# Patient Record
Sex: MALE | Race: WHITE | NOT HISPANIC OR LATINO | Employment: UNEMPLOYED | URBAN - METROPOLITAN AREA
[De-identification: names, ages, dates, MRNs, and addresses within clinical notes are randomized per-mention and may not be internally consistent; named-entity substitution may affect disease eponyms.]

---

## 2018-03-24 ENCOUNTER — HOSPITAL ENCOUNTER (INPATIENT)
Facility: HOSPITAL | Age: 23
LOS: 11 days | Discharge: HOME | DRG: 885 | End: 2018-04-04
Attending: PSYCHIATRY & NEUROLOGY | Admitting: PSYCHIATRY & NEUROLOGY
Payer: COMMERCIAL

## 2018-03-24 DIAGNOSIS — R45.851 SUICIDAL IDEATIONS: ICD-10-CM

## 2018-03-24 DIAGNOSIS — F22 PARANOIA (HCC): ICD-10-CM

## 2018-03-24 DIAGNOSIS — F33.3 SEVERE EPISODE OF RECURRENT MAJOR DEPRESSIVE DISORDER, WITH PSYCHOTIC FEATURES (HCC): ICD-10-CM

## 2018-03-24 DIAGNOSIS — F22 PARANOIA (HCC): Primary | ICD-10-CM

## 2018-03-24 DIAGNOSIS — R44.3 HALLUCINATIONS: Primary | ICD-10-CM

## 2018-03-24 LAB
ALBUMIN SERPL BCP-MCNC: 4.3 G/DL (ref 3.5–5)
ALP SERPL-CCNC: 98 U/L (ref 46–116)
ALT SERPL W P-5'-P-CCNC: 26 U/L (ref 12–78)
AMPHETAMINES SERPL QL SCN: NEGATIVE
ANION GAP SERPL CALCULATED.3IONS-SCNC: 12 MMOL/L (ref 4–13)
AST SERPL W P-5'-P-CCNC: 26 U/L (ref 5–45)
BARBITURATES UR QL: NEGATIVE
BASOPHILS # BLD AUTO: 0.03 THOUSANDS/ΜL (ref 0–0.1)
BASOPHILS NFR BLD AUTO: 0 % (ref 0–1)
BENZODIAZ UR QL: NEGATIVE
BILIRUB SERPL-MCNC: 1 MG/DL (ref 0.2–1)
BUN SERPL-MCNC: 11 MG/DL (ref 5–25)
CALCIUM SERPL-MCNC: 8.9 MG/DL (ref 8.3–10.1)
CHLORIDE SERPL-SCNC: 101 MMOL/L (ref 100–108)
CO2 SERPL-SCNC: 25 MMOL/L (ref 21–32)
COCAINE UR QL: NEGATIVE
CREAT SERPL-MCNC: 0.93 MG/DL (ref 0.6–1.3)
EOSINOPHIL # BLD AUTO: 0.04 THOUSAND/ΜL (ref 0–0.61)
EOSINOPHIL NFR BLD AUTO: 1 % (ref 0–6)
ERYTHROCYTE [DISTWIDTH] IN BLOOD BY AUTOMATED COUNT: 13.5 % (ref 11.6–15.1)
ETHANOL EXG-MCNC: NORMAL MG/DL
GFR SERPL CREATININE-BSD FRML MDRD: 117 ML/MIN/1.73SQ M
GLUCOSE SERPL-MCNC: 145 MG/DL (ref 65–140)
HCT VFR BLD AUTO: 46.2 % (ref 36.5–49.3)
HGB BLD-MCNC: 16.3 G/DL (ref 12–17)
LYMPHOCYTES # BLD AUTO: 2.36 THOUSANDS/ΜL (ref 0.6–4.47)
LYMPHOCYTES NFR BLD AUTO: 32 % (ref 14–44)
MCH RBC QN AUTO: 29.3 PG (ref 26.8–34.3)
MCHC RBC AUTO-ENTMCNC: 35.3 G/DL (ref 31.4–37.4)
MCV RBC AUTO: 83 FL (ref 82–98)
METHADONE UR QL: NEGATIVE
MONOCYTES # BLD AUTO: 0.73 THOUSAND/ΜL (ref 0.17–1.22)
MONOCYTES NFR BLD AUTO: 10 % (ref 4–12)
NEUTROPHILS # BLD AUTO: 4.32 THOUSANDS/ΜL (ref 1.85–7.62)
NEUTS SEG NFR BLD AUTO: 57 % (ref 43–75)
OPIATES UR QL SCN: NEGATIVE
PCP UR QL: NEGATIVE
PLATELET # BLD AUTO: 240 THOUSANDS/UL (ref 149–390)
PMV BLD AUTO: 11.5 FL (ref 8.9–12.7)
POTASSIUM SERPL-SCNC: 4.5 MMOL/L (ref 3.5–5.3)
PROT SERPL-MCNC: 8.1 G/DL (ref 6.4–8.2)
RBC # BLD AUTO: 5.56 MILLION/UL (ref 3.88–5.62)
SODIUM SERPL-SCNC: 138 MMOL/L (ref 136–145)
THC UR QL: POSITIVE
WBC # BLD AUTO: 7.48 THOUSAND/UL (ref 4.31–10.16)

## 2018-03-24 PROCEDURE — 99285 EMERGENCY DEPT VISIT HI MDM: CPT

## 2018-03-24 PROCEDURE — 36415 COLL VENOUS BLD VENIPUNCTURE: CPT | Performed by: PHYSICIAN ASSISTANT

## 2018-03-24 PROCEDURE — 85025 COMPLETE CBC W/AUTO DIFF WBC: CPT | Performed by: PHYSICIAN ASSISTANT

## 2018-03-24 PROCEDURE — 82075 ASSAY OF BREATH ETHANOL: CPT | Performed by: PHYSICIAN ASSISTANT

## 2018-03-24 PROCEDURE — 80053 COMPREHEN METABOLIC PANEL: CPT | Performed by: PHYSICIAN ASSISTANT

## 2018-03-24 PROCEDURE — 80307 DRUG TEST PRSMV CHEM ANLYZR: CPT | Performed by: PHYSICIAN ASSISTANT

## 2018-03-24 PROCEDURE — 99252 IP/OBS CONSLTJ NEW/EST SF 35: CPT | Performed by: PHYSICIAN ASSISTANT

## 2018-03-24 RX ORDER — NICOTINE 21 MG/24HR
1 PATCH, TRANSDERMAL 24 HOURS TRANSDERMAL DAILY
Status: CANCELLED | OUTPATIENT
Start: 2018-03-25

## 2018-03-24 RX ORDER — IBUPROFEN 400 MG/1
400 TABLET ORAL EVERY 8 HOURS PRN
Status: CANCELLED | OUTPATIENT
Start: 2018-03-24

## 2018-03-24 RX ORDER — IBUPROFEN 600 MG/1
600 TABLET ORAL EVERY 8 HOURS PRN
Status: DISCONTINUED | OUTPATIENT
Start: 2018-03-24 | End: 2018-04-04 | Stop reason: HOSPADM

## 2018-03-24 RX ORDER — IBUPROFEN 600 MG/1
600 TABLET ORAL EVERY 8 HOURS PRN
Status: CANCELLED | OUTPATIENT
Start: 2018-03-24

## 2018-03-24 RX ORDER — IBUPROFEN 400 MG/1
400 TABLET ORAL EVERY 8 HOURS PRN
Status: DISCONTINUED | OUTPATIENT
Start: 2018-03-24 | End: 2018-04-04 | Stop reason: HOSPADM

## 2018-03-24 RX ORDER — HALOPERIDOL 5 MG/ML
5 INJECTION INTRAMUSCULAR EVERY 6 HOURS PRN
Status: CANCELLED | OUTPATIENT
Start: 2018-03-24

## 2018-03-24 RX ORDER — HYDROXYZINE HYDROCHLORIDE 25 MG/1
25 TABLET, FILM COATED ORAL EVERY 6 HOURS PRN
Status: CANCELLED | OUTPATIENT
Start: 2018-03-24

## 2018-03-24 RX ORDER — HYDROXYZINE HYDROCHLORIDE 25 MG/1
25 TABLET, FILM COATED ORAL EVERY 6 HOURS PRN
Status: DISCONTINUED | OUTPATIENT
Start: 2018-03-24 | End: 2018-04-04 | Stop reason: HOSPADM

## 2018-03-24 RX ORDER — IBUPROFEN 800 MG/1
800 TABLET ORAL EVERY 8 HOURS PRN
Status: DISCONTINUED | OUTPATIENT
Start: 2018-03-24 | End: 2018-04-04 | Stop reason: HOSPADM

## 2018-03-24 RX ORDER — LORAZEPAM 2 MG/ML
2 INJECTION INTRAMUSCULAR EVERY 4 HOURS PRN
Status: CANCELLED | OUTPATIENT
Start: 2018-03-24

## 2018-03-24 RX ORDER — HALOPERIDOL 5 MG/ML
5 INJECTION INTRAMUSCULAR EVERY 6 HOURS PRN
Status: DISCONTINUED | OUTPATIENT
Start: 2018-03-24 | End: 2018-04-04 | Stop reason: HOSPADM

## 2018-03-24 RX ORDER — ACETAMINOPHEN 325 MG/1
650 TABLET ORAL EVERY 6 HOURS PRN
Status: DISCONTINUED | OUTPATIENT
Start: 2018-03-24 | End: 2018-04-04 | Stop reason: HOSPADM

## 2018-03-24 RX ORDER — LORAZEPAM 2 MG/ML
2 INJECTION INTRAMUSCULAR EVERY 4 HOURS PRN
Status: DISCONTINUED | OUTPATIENT
Start: 2018-03-24 | End: 2018-04-04 | Stop reason: HOSPADM

## 2018-03-24 RX ORDER — HALOPERIDOL 5 MG
5 TABLET ORAL EVERY 6 HOURS PRN
Status: CANCELLED | OUTPATIENT
Start: 2018-03-24

## 2018-03-24 RX ORDER — MAGNESIUM HYDROXIDE/ALUMINUM HYDROXICE/SIMETHICONE 120; 1200; 1200 MG/30ML; MG/30ML; MG/30ML
20 SUSPENSION ORAL EVERY 4 HOURS PRN
Status: DISCONTINUED | OUTPATIENT
Start: 2018-03-24 | End: 2018-04-04 | Stop reason: HOSPADM

## 2018-03-24 RX ORDER — IBUPROFEN 400 MG/1
800 TABLET ORAL EVERY 8 HOURS PRN
Status: CANCELLED | OUTPATIENT
Start: 2018-03-24

## 2018-03-24 RX ORDER — HALOPERIDOL 5 MG
5 TABLET ORAL EVERY 6 HOURS PRN
Status: DISCONTINUED | OUTPATIENT
Start: 2018-03-24 | End: 2018-04-04 | Stop reason: HOSPADM

## 2018-03-24 RX ORDER — BENZTROPINE MESYLATE 1 MG/1
1 TABLET ORAL EVERY 6 HOURS PRN
Status: DISCONTINUED | OUTPATIENT
Start: 2018-03-24 | End: 2018-04-04 | Stop reason: HOSPADM

## 2018-03-24 RX ORDER — NICOTINE 21 MG/24HR
1 PATCH, TRANSDERMAL 24 HOURS TRANSDERMAL DAILY
Status: DISCONTINUED | OUTPATIENT
Start: 2018-03-25 | End: 2018-04-04 | Stop reason: HOSPADM

## 2018-03-24 RX ORDER — BENZTROPINE MESYLATE 1 MG/1
1 TABLET ORAL EVERY 6 HOURS PRN
Status: CANCELLED | OUTPATIENT
Start: 2018-03-24

## 2018-03-24 RX ORDER — ACETAMINOPHEN 325 MG/1
650 TABLET ORAL EVERY 6 HOURS PRN
Status: CANCELLED | OUTPATIENT
Start: 2018-03-24

## 2018-03-24 RX ORDER — MAGNESIUM HYDROXIDE/ALUMINUM HYDROXICE/SIMETHICONE 120; 1200; 1200 MG/30ML; MG/30ML; MG/30ML
20 SUSPENSION ORAL EVERY 4 HOURS PRN
Status: CANCELLED | OUTPATIENT
Start: 2018-03-24

## 2018-03-24 NOTE — ED NOTES
Crisis called to speak with pt and pt hung up on him  Pt seems irritated and is asking to leave        Baudilio Mccall RN  03/24/18 3716

## 2018-03-24 NOTE — ED NOTES
Patient unable to urinate at this time, patient given fluids, tolerating well       Teresa Nova RN  03/24/18 3438

## 2018-03-24 NOTE — ED NOTES
CW did intake and SA over phone with Pt's permission  Pt's mother brought Pt to ED stating that Pt is not sleeping for days, exhibiting bizarre behavior, talking to self, as well as paranoid thoughts  She also stated that Pt has quit his job and is walking the house at night and entering her room  Pt's mother also stated to ED staff that Pt made a noose from a belt last week  Pt admits to making belt noose last week as well as feeling suicidal last week by denies since then  Pt admits to not sleeping for days on end and only a few hours a day most days past few weeks  Pt also admits to some of the behaviors that his mother describes and stated that he is "working through that "  Pt denies HI and psychosis  Pt was offered to sign himself in or his mother was going to petition for involuntary commitment  CW explained Pt his rights and Pt decided to sign 201  CW faxed 201 to [facility] ED   CW called [facility] as bed is available  CW directed ED at [facility] to fax signed [phone] to 2W for review
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
## 2018-03-24 NOTE — ED NOTES
Patient states, "im not doing the telephone or the video chat  I want to go home " Patient observed be to agitated pacing in room  Patient redirected by RN to speak to crisis regarding crisis evaluation  Patient agreed        Ramiro Young RN  03/24/18 6420

## 2018-03-24 NOTE — ED NOTES
CW called Pt from [facility] and offered to do intake and SA either over phone or through telepsych and Pt first stated over the phone  Pt then hung up on [address] called back ED at [facility] and was told Pt does not want to speak to CW and is paranoid and wants to leave  Ed Rn and PA at [facility] will have further conversation with Pt regarding cooperation with crisis

## 2018-03-25 PROBLEM — F29 PSYCHOSIS (HCC): Status: ACTIVE | Noted: 2018-03-25

## 2018-03-25 PROBLEM — F32.3 MAJOR DEPRESSIVE DISORDER WITH PSYCHOTIC FEATURES (HCC): Status: ACTIVE | Noted: 2018-03-25

## 2018-03-25 PROCEDURE — 99223 1ST HOSP IP/OBS HIGH 75: CPT | Performed by: PSYCHIATRY & NEUROLOGY

## 2018-03-25 RX ORDER — RISPERIDONE 1 MG/1
1 TABLET, FILM COATED ORAL 2 TIMES DAILY
Status: DISCONTINUED | OUTPATIENT
Start: 2018-03-25 | End: 2018-03-25
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
## 2018-03-25 NOTE — H&P
Psychiatric Evaluation - [address] 26 y o  male MRN: [id number]  Unit/Bed#: [facility] 263-01 Encounter: [id number]    Assessment/Plan   Principal Problem:    Major depressive disorder with psychotic features ([address] )    Plan:   1  Discussed risperdal and ssri, he is refusing all medications currently  2  Collateral from family  3  Check lipid panel, tsh  Risks, benefits and possible side effects of Medications:   Patient does not verbalize understanding at this time and will require further explanation  unwilling to stay long inpatient, "I don't need medications"    Chief Complaint: "I have not been myself lately"    History of Present Illness     Patient is a 26 y o  male presents with Signs of acute psychosis  Patient was admitted to psychiatric unit on a voluntarily 201 commitment basis  He was brought to ED by family for evaluation of bizarre behavior, paranoia and attempt to hang self last week with belt  Mother wanted to 36 but patient signed in  Primary complaints include: poor sleep, depression worse and feeling suicidal   Onset of symptoms was gradual starting a few weeks ago with gradually worsening course since that time   Psychosocial Stressors: family, financial, drug and alcohol and social   "I want to get my life back, I'm pretty sound in my mind"    Psychiatric Review Of Systems:  sleep: yes  appetite changes: yes  weight changes: yes "I'm losing weight at a steady pace"  energy/anergy: yes  interest/pleasure/anhedonia: yes  somatic symptoms: no  anxiety/panic: yes  ksenia: poor sleep  guilty/hopeless: yes, feels   self injurious behavior/risky behavior: yes    Historical Information     Past Psychiatric History:   Therapy, Out Patient and evaluated at [facility] for evaluation  Prior diagnosis of ADHD as child  Currently in treatment with therapist  Past Suicide attempts: none prior  Past Violent behavior: if needed to protect  Past Psychiatric medication trial: Kevyn Viveros ("I get frizzy"    Substance Abuse History:  Social History     Tobacco History     Smoking Status  Current Every Day Smoker Smoking Frequency  0 25 packs/day    Smokeless Tobacco Use  Never Used          Alcohol History     Alcohol Use Status  Yes Comment  occasionally          Drug Use     Drug Use Status  Yes Types  Marijuana, Prescription Comment  hx of           Sexual Activity     Sexually Active  Not Asked          Activities of Daily Living    Not Asked               Additional Substance Use Detail     Questions Responses    Substance Use Assessment Substance use within the past 12 months    Alcohol Use Frequency Experimented    Cannabis frequency 3 or more times/week    Comment: 3 or more times/week on 3/24/2018     Heroin Frequency Denies use in past 12 months    Last Use of Alcohol & Amount socially    Crack Cocaine Frequency Denies use in past 12 months    Methamphetamine Frequency Denies use in past 12 months    Narcotic Frequency Denies use in past 12 months    Benzodiazepine Frequency Denies use in past 12 months    Amphetamine frequency Denies use in past 12 months    Barbituate Frequency Denies use use in past 12 months    Inhalant frequency Never used    Comment: Never used on 3/24/2018     Hallucinogen frequency Never used    Comment: Never used on 3/24/2018     Ecstasy frequency Never used    Comment: Never used on 3/24/2018     Other drug frequency Never used    Comment: Never used on 3/24/2018     Opiate frequency Denies use in past 12 months    Last reviewed by Patti Anthony RN on 3/24/2018        I have assessed this patient for substance use within the past 12 months    Family Psychiatric History:   Psychiatric Illness grandfather Illness: bipolar and Hospitalization: inpatient  No substance    Social History:  Education: some college  Marital history: single  Living arrangement, social support: The patient lives in home with mother and brother    Occupational History: worked at [employer]  Functioning Relationships: alone & isolated  Other Pertinent History: None      Traumatic History:   Abuse: "nothing really that serious"  Other Traumatic Events: father passed away dennise patient was very young    Past Medical History:   Diagnosis Date    ADHD     Oppositional defiant disorder     Psychiatric illness     Psychosis        Medical Review Of Systems:  Pertinent items are noted in HPI      Meds/Allergies   current meds:   Current Facility-Administered Medications   Medication Dose Route Frequency    acetaminophen (TYLENOL) tablet 650 mg  650 mg Oral Q6H PRN    aluminum-magnesium hydroxide-simethicone (MYLANTA) 200-200-20 mg/5 mL oral suspension 20 mL  20 mL Oral Q4H PRN    benztropine (COGENTIN) tablet 1 mg  1 mg Oral Q6H PRN    haloperidol (HALDOL) tablet 5 mg  5 mg Oral Q6H PRN    haloperidol lactate (HALDOL) injection 5 mg  5 mg Intramuscular Q6H PRN    hydrOXYzine HCL (ATARAX) tablet 25 mg  25 mg Oral Q6H PRN    ibuprofen (MOTRIN) tablet 400 mg  400 mg Oral Q8H PRN    ibuprofen (MOTRIN) tablet 600 mg  600 mg Oral Q8H PRN    ibuprofen (MOTRIN) tablet 800 mg  800 mg Oral Q8H PRN    influenza inactivated quadrivalent vaccine (FLULAVAL) IM injection 0 5 mL  0 5 mL Intramuscular Prior to discharge    LORazepam (ATIVAN) 2 mg/mL injection 2 mg  2 mg Intramuscular Q4H PRN    magnesium hydroxide (MILK OF MAGNESIA) 400 mg/5 mL oral suspension 30 mL  30 mL Oral Daily PRN    nicotine (NICODERM CQ) 21 mg/24 hr TD 24 hr patch 1 patch  1 patch Transdermal Daily     Allergies   Allergen Reactions    Adderall [Amphetamine-Dextroamphetamine]      "temper tantrums and behavior when he was in 3rd grade" - mother    Amoxicillin Hives       Objective   Vital signs in last 24 hours:  Temp:  [96 °F (35 6 °C)-98 5 °F (36 9 °C)] 98 5 °F (36 9 °C)  HR:  [] 88  Resp:  [17-18] 17  BP: (119-131)/(58-76) 119/58    No intake or output data in the 24 hours ending 03/25/18 1128    Mental Status Evaluation:  Appearance:  casually dressed and disheveled   Behavior:  evasive and guarded   Speech:  soft   Mood:  depressed   Affect:  labile   Language: naming objects   Thought Process:  sparse   Thought Content:  delusions  paranoid   Perceptual Disturbances: appears preoccupied but denies AVH "I'm not psychotic"   Risk Potential: Suicidal Ideations without plan "I feel that I don't belong"   Sensorium:  person, place and situation   Cognition:  grossly intact   Consciousness:  alert and awake    Attention: attention span appeared shorter than expected for age   Intellect: not examined   Fund of Knowledge: awareness of current events: intact   Insight:  limited   Judgment: limited   Muscle Strength and Tone: moves all extremities equally   Gait/Station: normal gait/station   Motor Activity: no abnormal movements     Lab Results:   CBC:   Lab Results   Component Value Date    WBC 7 48 03/24/2018    HGB 16 3 03/24/2018    HCT 46 2 03/24/2018    MCV 83 03/24/2018     03/24/2018    MCH 29 3 03/24/2018    MCHC 35 3 03/24/2018    RDW 13 5 03/24/2018    MPV 11 5 03/24/2018       Imaging Studies: none ordered  EKG, Pathology, and Other Studies: none ordered  Code Status: Level 1 - Full Code  Advance Directive and Living Will:      Power of :    POLST:        Patient Strengths/Assets: average or above intelligence, work skills    Patient Barriers/Limitations: low self esteem, patient is unwilling to work on problems, poor interpersonal skills

## 2018-03-25 NOTE — ED NOTES
PC placed to [phone] where Dannial Saint directed [address] to [phone]   PC placed to that number Madisyn Tripp stated she placed client in the [address] and am awaiting a call back from a care manager

## 2018-03-25 NOTE — ED NOTES
COB completed with [facility], who requested they be called when pt is DC from the unit

## 2018-03-25 NOTE — PROGRESS NOTES
Pt has been sleeping all of night shift  Pt was offered quiet room, but decided to sleep in his room  Will continue to monitor

## 2018-03-25 NOTE — PROGRESS NOTES
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

## 2018-03-25 NOTE — ED NOTES
PC placed to [phone] where Charley Srinivasan approved 7 dayd from 3/24-3/30/2018 with review on the 30th   Reference number of [id number]

## 2018-03-25 NOTE — CONSULTS
Consult Note  Assessment:  Paranoia  ADHD  Substance abuse-UDS is positive for THC on admission      Plan:  Admitted in the hospital for psych evaluation  Continue all medications  ______________________________________________________________________    Consulting Service: Psychiatry    Chief Complaint:  Behavioral changes, suicide ideation    HPI: Sivakumar Yu, a 26 y o  male is admitted in the hospital for worsening depression, behavioral changes, paranoia and suicide ideation/attempt  Patient has been talking to himself and states that his talking to a female, feels that he is being watched and followed by 80 mm, now SI and CA are watching him  History of sleep due to disturbances as well, sleeping for days as well  Patient denies any homicide ideation, any auditory or visual hallucinations  Please refer to psych evaluation for more information  Patient at present denies any medical issues including chest pain, difficulty breathing, fever, chills, nausea, vomiting, or any abdominal issues    Review of Systems:  General: negative  Cardiovascular: no chest pain or dyspnea on exertion  Respiratory: no cough, shortness of breath, or wheezing  Gastrointestinal: no abdominal pain, change in bowel habits, or black or bloody stools  Genitourinary ROS: no dysuria, trouble voiding, or hematuria  Musculoskeletal ROS: negative  Neurological ROS: no TIA or stroke symptoms  Hematological and Lymphatic ROS: negative  Dermatological ROS: negative  Psychological ROS: positive for - depression, sleep disturbances, suicidal ideation and suicide attempt, paranoia  Ophthalmic ROS: negative  ENT ROS: negative    Past Medical History:  Past Medical History:   Diagnosis Date    ADHD     Oppositional defiant disorder     Psychiatric illness     Psychosis        Past Surgical History:  Past Surgical History:   Procedure Laterality Date    TONSILECTOMY AND ADNOIDECTOMY      WISDOM TOOTH EXTRACTION         Social History:  History   Alcohol Use    Yes     Comment: occasionally     History   Drug Use    Types: Prescription     Comment: hx of      History   Smoking Status    Current Every Day Smoker   Smokeless Tobacco    Never Used       Family History:  History reviewed  No pertinent family history  Allergies: Allergies   Allergen Reactions    Adderall [Amphetamine-Dextroamphetamine]      "temper tantrums and behavior when he was in 3rd grade" - mother    Amoxicillin Hives       Medications:  Current Facility-Administered Medications   Medication Dose Route Frequency    acetaminophen (TYLENOL) tablet 650 mg  650 mg Oral Q6H PRN    aluminum-magnesium hydroxide-simethicone (MYLANTA) 200-200-20 mg/5 mL oral suspension 20 mL  20 mL Oral Q4H PRN    benztropine (COGENTIN) tablet 1 mg  1 mg Oral Q6H PRN    haloperidol (HALDOL) tablet 5 mg  5 mg Oral Q6H PRN    haloperidol lactate (HALDOL) injection 5 mg  5 mg Intramuscular Q6H PRN    hydrOXYzine HCL (ATARAX) tablet 25 mg  25 mg Oral Q6H PRN    ibuprofen (MOTRIN) tablet 400 mg  400 mg Oral Q8H PRN    ibuprofen (MOTRIN) tablet 600 mg  600 mg Oral Q8H PRN    ibuprofen (MOTRIN) tablet 800 mg  800 mg Oral Q8H PRN    LORazepam (ATIVAN) 2 mg/mL injection 2 mg  2 mg Intramuscular Q4H PRN    magnesium hydroxide (MILK OF MAGNESIA) 400 mg/5 mL oral suspension 30 mL  30 mL Oral Daily PRN    [START ON 3/25/2018] nicotine (NICODERM CQ) 21 mg/24 hr TD 24 hr patch 1 patch  1 patch Transdermal Daily       Vitals:    03/24/18 2034   BP: 131/75   Pulse: 89   Resp: 18   Temp: (!) 96 °F (35 6 °C)   SpO2:          I/Os:  No intake/output data recorded  No intake/output data recorded      Lab Results and Cultures:   CBC with diff:   Lab Results   Component Value Date    WBC 7 48 03/24/2018    HGB 16 3 03/24/2018    HCT 46 2 03/24/2018    MCV 83 03/24/2018     03/24/2018    MCH 29 3 03/24/2018    MCHC 35 3 03/24/2018    RDW 13 5 03/24/2018    MPV 11 5 03/24/2018   , BMP/CMP:  Lab Results   Component Value Date     03/24/2018    K 4 5 03/24/2018     03/24/2018    CO2 25 03/24/2018    ANIONGAP 12 03/24/2018    BUN 11 03/24/2018    CREATININE 0 93 03/24/2018    GLUCOSE 145 (H) 03/24/2018    CALCIUM 8 9 03/24/2018    AST 26 03/24/2018    ALT 26 03/24/2018    ALKPHOS 98 03/24/2018    PROT 8 1 03/24/2018    BILITOT 1 00 03/24/2018    EGFR 117 03/24/2018       Physical Exam:    General appearance: alert and oriented, in no acute distress  Head: Normocephalic, without obvious abnormality, atraumatic  Eyes: conjunctivae/corneas clear  PERRL, EOM's intact  Fundi benign  Throat: lips, mucosa, and tongue normal; teeth and gums normal  Neck: no adenopathy, no carotid bruit, no JVD, supple, symmetrical, trachea midline and thyroid not enlarged, symmetric, no tenderness/mass/nodules  Back: symmetric, no curvature  ROM normal  No CVA tenderness  Lungs: clear to auscultation bilaterally  Chest wall: no tenderness  Heart: regular rate and rhythm, S1, S2 normal, no murmur, click, rub or gallop  Abdomen: soft, non-tender; bowel sounds normal; no masses,  no organomegaly  Genitalia: deferred  Rectal: deferred  Extremities: extremities normal, warm and well-perfused; no cyanosis, clubbing, or edema  Skin: Skin color, texture, turgor normal  No rashes or lesions  Neurologic: Alert and oriented X 3, normal strength and tone  Normal symmetric reflexes   Normal coordination and gait      Julisa Gusman PA-C  3/24/2018

## 2018-03-26 LAB
CHOLEST SERPL-MCNC: 114 MG/DL (ref 50–200)
HDLC SERPL-MCNC: 58 MG/DL (ref 40–60)
LDLC SERPL CALC-MCNC: 43 MG/DL (ref 0–100)
TRIGL SERPL-MCNC: 67 MG/DL
TSH SERPL DL<=0.05 MIU/L-ACNC: 1.95 UIU/ML (ref 0.36–3.74)

## 2018-03-26 PROCEDURE — 99232 SBSQ HOSP IP/OBS MODERATE 35: CPT | Performed by: PSYCHIATRY & NEUROLOGY

## 2018-03-26 PROCEDURE — 84443 ASSAY THYROID STIM HORMONE: CPT | Performed by: PSYCHIATRY & NEUROLOGY

## 2018-03-26 PROCEDURE — 80061 LIPID PANEL: CPT | Performed by: PSYCHIATRY & NEUROLOGY

## 2018-03-26 RX ORDER — OLANZAPINE 5 MG/1
5 TABLET, ORALLY DISINTEGRATING ORAL
Status: DISCONTINUED | OUTPATIENT
Start: 2018-03-26 | End: 2018-03-28

## 2018-03-26 RX ADMIN — HALOPERIDOL 5 MG: 5 TABLET ORAL at 18:59

## 2018-03-26 RX ADMIN — NICOTINE POLACRILEX 4 MG: 4 GUM, CHEWING ORAL at 20:49

## 2018-03-26 RX ADMIN — OLANZAPINE 5 MG: 5 TABLET, ORALLY DISINTEGRATING ORAL at 21:21

## 2018-03-26 RX ADMIN — HYDROXYZINE HYDROCHLORIDE 25 MG: 25 TABLET, FILM COATED ORAL at 18:59

## 2018-03-26 NOTE — PLAN OF CARE
Problem: Ineffective Coping  Goal: Participates in unit activities  Interventions:  - Provide therapeutic environment   - Provide required programming   - Redirect inappropriate behaviors    Outcome: Progressing  Pt attended groups with prompting  He presented as restless often standing to pace room  Pt with difficulty focusing on presented tasks and appeared distracted throughout group  He was observed shaking his head, smiling, and whispering to himself at times    Pt declined to complete task and proceeded to throw project in trash can stating"I don't like art and crafts "

## 2018-03-26 NOTE — PROGRESS NOTES
Pt has been visible on the unit and has attended groups  He denies S/H/I or AVH, and states he just wants to get back to his life of being a  and pursue being EMT  Will continue to monitor, provide support and encouragement
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
## 2018-03-26 NOTE — TREATMENT PLAN
TREATMENT PLAN REVIEW - [address] [facility] [address] 23 y o  1995 male MRN: [id number]    [address] [place] [address] [facility] / [facility] 424-26 Encounter: [id number]          Admit Date/Time:  3/24/2018  2:01 PM    Treatment Team: Attending Provider: Caleb Cobian; Registered Nurse: Wendy Tinoco, RN; Consulting Physician: Joelle Jain PA-C; Patient Care Assistant: Alessio Wyman;  Patient Care Technician: Pablo Felix; Registered Nurse: Luisito Hatch RN; Occupational Therapy Assistant: CECILLE Pemberton; : Carol Cool    Diagnosis: Principal Problem:    Major depressive disorder with psychotic features [facility]    Patient Strengths/Assets: cooperative, good past treatment response, motivation for treatment/growth, patient is on a voluntary commitment    Patient Barriers/Limitations: low self esteem, substance abuse    Short Term Goals: decrease in depressive symptoms, decrease in anxiety symptoms, decrease in paranoid thoughts, decrease in psychotic symptoms, decrease in suicidal thoughts    Long Term Goals: improvement in depression, improvement in anxiety, stabilization of mood, free of suicidal thoughts, resolution of psychotic symptoms, acceptance of need for psychiatric medications, acceptance of need for psychiatric treatment, acceptance of need for psychiatric follow up after discharge    Progress Towards Goals: starting psychiatric medications as prescribed    Recommended Treatment: medication management, patient medication education, group therapy, milieu therapy, continued Behavioral Health psychiatric evaluation/assessment process    Treatment Frequency: daily medication monitoring, group and milieu therapy daily, monitoring through interdisciplinary rounds, monitoring through weekly patient care conferences    Expected Discharge Date: 5-7 days    Discharge Plan: referral for outpatient medication management with a psychiatrist, referral for outpatient psychotherapy    Treatment Plan Created/Updated By: Brook Shepherd

## 2018-03-26 NOTE — PROGRESS NOTES
Progress Note - Behavioral Health   Giancarlo [place] 26 y o  male MRN: [id number]  Unit/Bed#: U 263-01 Encounter: [id number]    Assessment/Plan   Principal Problem:    Major depressive disorder with psychotic features ([facility] [address] )      Subjective:  Patient remained guarded and paranoid during entire evaluation  Patient was initially not cooperative with evaluation and I had to read emergency room doctor's note and mother's concerns for this admission  Patient did agreed that he was paranoid for longtime but after using Vyvanse 2 months ago his paranoia got worse followed by decline in sleep, increase anxiety and depression  Patient initially was not agreeable with medication  He was allowed to express his feeling and then he verbalized that he will trust me  Agreed with plan of taking Zyprexa at at [facility] now  Current Medications:    Current Facility-Administered Medications:  acetaminophen 650 mg Oral Q6H PRN Governor Alexandra MD   aluminum-magnesium hydroxide-simethicone 20 mL Oral Q4H PRN Governor Alexandra MD   benztropine 1 mg Oral Q6H PRN Governor Alexandra MD   haloperidol 5 mg Oral Q6H PRN Governor Alexandra MD   haloperidol lactate 5 mg Intramuscular Q6H PRN Governor Alexandra MD   hydrOXYzine HCL 25 mg Oral Q6H PRN Governor Alexandra MD   ibuprofen 400 mg Oral Q8H PRN Governor Alexandra MD   ibuprofen 600 mg Oral Q8H PRN Governor Alexandra MD   ibuprofen 800 mg Oral Q8H PRN Governor Alexandra MD   influenza vaccine 0 5 mL Intramuscular Prior to discharge Luis Grider   LORazepam 2 mg Intramuscular Q4H PRN Governor Alexandra MD   magnesium hydroxide 30 mL Oral Daily PRN Governor Alexandra MD   nicotine 1 patch Transdermal Daily Governor Alexandra MD   OLANZapine 5 mg Oral HS [address] Medications: all current active meds have been reviewed      Vital signs in last 24 hours:  Temp:  [95 °F (35 °C)-97 3 °F (36 3 °C)] 97 3 °F (36 3 °C)  HR: [] 120  Resp:  [18] 18  BP: (116-131)/(71-73) 116/73    Laboratory results:    I have personally reviewed all pertinent laboratory/tests results    Labs in last 72 hours:   Recent Labs      03/24/18   1504  03/24/18   1505  03/26/18   0614   WBC   --   7 48   --    RBC   --   5 56   --    HGB   --   16 3   --    HCT   --   46 2   --    PLT   --   240   --    RDW   --   13 5   --    NEUTROABS   --   4 32   --    NA  138   --    --    K  4 5   --    --    CL  101   --    --    CO2  25   --    --    BUN  11   --    --    CREATININE  0 93   --    --    GLUCOSE  145*   --    --    CALCIUM  8 9   --    --    AST  26   --    --    ALT  26   --    --    ALKPHOS  98   --    --    PROT  8 1   --    --    BILITOT  1 00   --    --    CHOL   --    --   114   HDL   --    --   58   TRIG   --    --   67   LDLCALC   --    --   43   [id number]   --    --   1 949     Admission Labs:   Admission on 03/24/2018   Component Date Value    EXTBreath Alcohol 03/24/2018 neg     Amph/Meth UR 03/24/2018 Negative     Barbiturate Ur 03/24/2018 Negative     Benzodiazepine Urine 03/24/2018 Negative     Cocaine Urine 03/24/2018 Negative     Methadone Urine 03/24/2018 Negative     Opiate Urine 03/24/2018 Negative     PCP Ur 03/24/2018 Negative     THC Urine 03/24/2018 Positive*    WBC 03/24/2018 7 48     RBC 03/24/2018 5 56     Hemoglobin 03/24/2018 16 3     Hematocrit 03/24/2018 46 2     MCV 03/24/2018 83     MCH 03/24/2018 29 3     MCHC 03/24/2018 35 3     RDW 03/24/2018 13 5     MPV 03/24/2018 11 5     Platelets 79/77/5351 240     Neutrophils Relative 03/24/2018 57     Lymphocytes Relative 03/24/2018 32     Monocytes Relative 03/24/2018 10     Eosinophils Relative 03/24/2018 1     Basophils Relative 03/24/2018 0     Neutrophils Absolute 03/24/2018 4 32     Lymphocytes Absolute 03/24/2018 2 36     Monocytes Absolute 03/24/2018 0 73     Eosinophils Absolute 03/24/2018 0 04     Basophils Absolute 03/24/2018 0 03  Sodium 03/24/2018 138     Potassium 03/24/2018 4 5     Chloride 03/24/2018 101     CO2 03/24/2018 25     Anion Gap 03/24/2018 12     BUN 03/24/2018 11     Creatinine 03/24/2018 0 93     Glucose 03/24/2018 145*    Calcium 03/24/2018 8 9     AST 03/24/2018 26     ALT 03/24/2018 26     Alkaline Phosphatase 03/24/2018 98     Total Protein 03/24/2018 8 1     Albumin 03/24/2018 4 3     Total Bilirubin 03/24/2018 1 00     eGFR 03/24/2018 117     Cholesterol 03/26/2018 114     Triglycerides 03/26/2018 67     HDL, Direct 03/26/2018 58     LDL Calculated 03/26/2018 43     TSH 3RD GENERATON 03/26/2018 1 949        Psychiatric Review of Systems:  Behavior over the last 24 hours:  unchanged  Sleep: insomnia  Appetite: normal  Medication side effects: No  ROS: no complaints    Mental Status Evaluation:  Appearance:  casually dressed   Behavior:  guarded   Speech:  soft   Mood:  angry, anxious and depressed   Affect:  blunted   Language sparse   Thought Process:  circumstantial and disorganized   Thought Content:  delusions  persecutory   Perceptual Disturbances: preoccupied   Risk Potential: Suicidal Ideations without plan, Homicidal Ideations none and Potential for Aggression No   Sensorium:  person and place   Cognition:  grossly intact   Consciousness:  awake    Attention: attention span appeared shorter than expected for age   Insight:  limited   Judgment: limited   Intellect fair   Gait/Station: normal gait/station   Motor Activity: no abnormal movements     Progress Toward Goals: slow progress    Recommended Treatment:   Add Olanzapine 5 mg HS for psychosis and mood stabilization  Continue with group therapy, milieu therapy and occupational therapy      Continue following current medications:   Current Facility-Administered Medications:  acetaminophen 650 mg Oral Q6H PRN Leo Marquez MD   aluminum-magnesium hydroxide-simethicone 20 mL Oral Q4H PRN Leo Marquez MD   benztropine 1 mg Oral Q6H PRN Sabrina Mishra MD   haloperidol 5 mg Oral Q6H PRN Sabrina Mishra MD   haloperidol lactate 5 mg Intramuscular Q6H PRN Sabrina Mishra MD   hydrOXYzine HCL 25 mg Oral Q6H PRN Sabrina Mishra MD   ibuprofen 400 mg Oral Q8H PRN Sabrina Mishra MD   ibuprofen 600 mg Oral Q8H PRN Sabrina Mishra MD   ibuprofen 800 mg Oral Q8H PRN Sabrina Mishra MD   influenza vaccine 0 5 mL Intramuscular Prior to discharge Luis Grider   LORazepam 2 mg Intramuscular Q4H PRN Sabrina Mishra MD   magnesium hydroxide 30 mL Oral Daily PRN Sabrina Mishra MD   nicotine 1 patch Transdermal Daily Sabrina Mishra MD   OLANZapine 5 mg Oral HS Luis Grider       Risks, benefits and possible side effects of Medications:   Risks, benefits, and possible side effects of medications explained to patient and patient verbalizes understanding  This note has been constructed using a voice recognition system  There may be translation, syntax,  or grammatical errors  If you have any questions, please contact the dictating provider

## 2018-03-26 NOTE — PROGRESS NOTES
Pt was observed at the exit door, looking out window, smiling inappropriately  Pt was reminded that alarm will sound if door is opened  Pt was encouraged to move away from door  Will continue to monitor

## 2018-03-26 NOTE — PROGRESS NOTES
Denies any mental issues  Said that he doesn't need medication and/ or treatment  Gently urged him to think about why Mom brought him here and that medications can be helpful to think more clearly  Shows history of poor coping  We discussed this too  Pt acknowledged but wouldn't look at writer and laughed inappropriately  Seclusive to room and self  Denies SI and HI  Denies A/ V Hallucinations

## 2018-03-26 NOTE — PLAN OF CARE
Problem: Alteration in Thoughts and Perception  Goal: Treatment Goal: Gain control of psychotic behaviors/thinking, reduce/eliminate presenting symptoms and demonstrate improved reality functioning upon discharge  Outcome: Not Progressing    Goal: Verbalize thoughts and feelings  Interventions:  - Promote a nonjudgmental and trusting relationship with the patient through active listening and therapeutic communication  - Assess patient's level of functioning, behavior and potential for risk  - Engage patient in 1 on 1 interactions for a minimum of 15 minutes each session  - Encourage patient to express fears, feelings, frustrations, and discuss symptoms    - [place] patient to reality, help patient recognize reality-based thinking   - Administer medications as ordered and assess for potential side effects  - Provide the patient education related to the signs and symptoms of the illness and desired effects of prescribed medications   Outcome: Progressing    Goal: Refrain from acting on delusional thinking/internal stimuli  Interventions:  - Monitor patient closely, per order   - Utilize least restrictive measures   - Set reasonable limits, give positive feedback for acceptable   - Administer medications as ordered and monitor of potential side effects   Outcome: Not Progressing    Goal: Agree to be compliant with medication regime, as prescribed and report medication side effects  Interventions:  - Offer appropriate PRN medication and supervise ingestion; conduct aims, as needed    Outcome: Progressing    Goal: Attend and participate in unit activities, including therapeutic, recreational, and educational groups  Interventions:  - Provide therapeutic and educational activities daily, encourage attendance and participation, and document same in the medical record    Outcome: Progressing    Goal: Recognize dysfunctional thoughts, communicate reality-based thoughts at the time of discharge  Interventions:  - Provide medication and psycho-education to assist patient in compliance and developing insight into his/her illness    Outcome: Not Progressing    Goal: Complete daily ADLs, including personal hygiene independently, as able  Interventions:  - Observe, teach, and assist patient with ADLS  - Monitor and promote a balance of rest/activity, with adequate nutrition and elimination    Outcome: Progressing

## 2018-03-26 NOTE — PROGRESS NOTES
Pt with depressed mood, on the verge of tears at times  Rates anxiety as a "5," and depression as a "1 " Denies S/H/I or AVH, but has been observed smiling and laughing inappropriately  Pt has been visible on the unit, is seclusive to self  Will continue to monitor

## 2018-03-27 ENCOUNTER — APPOINTMENT (INPATIENT)
Dept: CT IMAGING | Facility: HOSPITAL | Age: 23
DRG: 885 | End: 2018-03-27
Payer: COMMERCIAL

## 2018-03-27 LAB
BACTERIA UR QL AUTO: ABNORMAL /HPF
BILIRUB UR QL STRIP: NEGATIVE
CLARITY UR: CLEAR
COLOR UR: YELLOW
GLUCOSE UR STRIP-MCNC: NEGATIVE MG/DL
HGB UR QL STRIP.AUTO: NEGATIVE
KETONES UR STRIP-MCNC: NEGATIVE MG/DL
LEUKOCYTE ESTERASE UR QL STRIP: NEGATIVE
NITRITE UR QL STRIP: NEGATIVE
NON-SQ EPI CELLS URNS QL MICRO: ABNORMAL /HPF
PH UR STRIP.AUTO: 6 [PH] (ref 4.5–8)
PROT UR STRIP-MCNC: NEGATIVE MG/DL
RBC #/AREA URNS AUTO: ABNORMAL /HPF
SP GR UR STRIP.AUTO: 1.02 (ref 1–1.03)
UROBILINOGEN UR QL STRIP.AUTO: 0.2 E.U./DL
WBC #/AREA URNS AUTO: ABNORMAL /HPF

## 2018-03-27 PROCEDURE — 70450 CT HEAD/BRAIN W/O DYE: CPT

## 2018-03-27 PROCEDURE — 81001 URINALYSIS AUTO W/SCOPE: CPT | Performed by: PSYCHIATRY & NEUROLOGY

## 2018-03-27 PROCEDURE — 99232 SBSQ HOSP IP/OBS MODERATE 35: CPT | Performed by: PSYCHIATRY & NEUROLOGY

## 2018-03-27 RX ADMIN — OLANZAPINE 5 MG: 5 TABLET, ORALLY DISINTEGRATING ORAL at 21:26

## 2018-03-27 RX ADMIN — ALUMINUM HYDROXIDE, MAGNESIUM HYDROXIDE, AND SIMETHICONE 20 ML: 200; 200; 20 SUSPENSION ORAL at 19:24

## 2018-03-27 NOTE — PROGRESS NOTES
Pt observed slowly walking halls throughout morning  Appears internally preoccupied and observed softly talking/mumbling to himself at times  Pt denies hallucinations  Does not feel that he has anything wrong with him psychiatrically  Bizarre in conversation  Pt frequently says that he does not want to be "on this side" of the hospital and wants to work in a healthcare profession  Pt denies hallucinations  Laughed when questioned about hallucinations  Pt said that he was not hearing voices last evening, he could hear his family talking downstairs through the floor  Pt admits to depression  Denies SI  Pt made comment that he would rather have something concrete wrong with him "like a tumor that is inoperable or being told I have stage 4 cancer " Pt laughed when questioned if he wants to die or has thoughts of suicide and said that is not what he means  Pt cooperative with CT after calling mom to make sure she was okay with this  Received phone call from pt's mother Aníbal De Paz requesting an update  Pt signed SHASHI  Mother was given a status update  Requesting call from CM  Pt's CM aware  Mother reported that pt believes he is being discharged today and requested that staff inform him that he is not being discharged  Met with pt and this was reviewed  Pt seemed to handle this okay  Mother said she would be visiting pt this evening

## 2018-03-27 NOTE — PROGRESS NOTES
Chief Complaint   Patient presents with     Fracture       Initial /72  Temp 98.1  F (36.7  C) (Oral)  Wt 201 lb (91.2 kg)  BMI 27.26 kg/m2 Estimated body mass index is 27.26 kg/(m^2) as calculated from the following:    Height as of 8/21/17: 6' (1.829 m).    Weight as of this encounter: 201 lb (91.2 kg).  Medication Reconciliation: complete     Lilliana Willett MA       Progress Note - Behavioral Health   Mukesh Love 26 y o  male MRN: [id number]  Unit/Bed#: [facility] 254-01 Encounter: [id number]    Assessment/Plan   Principal Problem:    Major depressive disorder with psychotic features ([address] )      Subjective:  Patient is compliant with Zyprexa and have mild sedation noted in the morning time  He does report sleeping well after longtime and is agreeing to take medications  No other acute side effects noted  Patient is guarded and paranoid but he is more spontaneous in conversation  Patient appears preoccupied on the unit as noted by staff  Patient agreed to get CT head today to rule out acute intracranial abnormalities  Patient continues to remained with poor insight regarding his underlying psychiatric condition  He will need continuous psycho-education during this hospitalization  Current Medications:    Current Facility-Administered Medications:  acetaminophen 650 mg Oral Q6H PRN Jj Chase MD   aluminum-magnesium hydroxide-simethicone 20 mL Oral Q4H PRN Jj Chase MD   benztropine 1 mg Oral Q6H PRN Jj Chase MD   haloperidol 5 mg Oral Q6H PRN Jj Chase MD   haloperidol lactate 5 mg Intramuscular Q6H PRN Jj Chase MD   hydrOXYzine HCL 25 mg Oral Q6H PRN Jj Chase MD   ibuprofen 400 mg Oral Q8H PRN Jj Chase MD   ibuprofen 600 mg Oral Q8H PRN Jj Chase MD   ibuprofen 800 mg Oral Q8H PRN Jj Chase MD   influenza vaccine 0 5 mL Intramuscular Prior to discharge Luis Grider   LORazepam 2 mg Intramuscular Q4H PRN Jj Chase MD   magnesium hydroxide 30 mL Oral Daily PRN Jj Chase MD   nicotine 1 patch Transdermal Daily Jj Chase MD   nicotine polacrilex 4 mg Oral Q2H PRN IMELDA Asencio   OLANZapine 5 mg Oral HS [address] Medications: all current active meds have been reviewed      Vital signs in last 24 hours:  Temp:  [97 5 °F (36 4 °C)-97 7 °F (36 5 °C)] 97 5 °F (36 4 °C)  HR:  [85-96] 96  Resp:  [15-16] 16  BP: (113-122)/(59-69) 113/59    Laboratory results:    I have personally reviewed all pertinent laboratory/tests results    Labs in last 72 hours:   Recent Labs      03/24/18   1504  03/24/18   1505  03/26/18   0614   WBC   --   7 48   --    RBC   --   5 56   --    HGB   --   16 3   --    HCT   --   46 2   --    PLT   --   240   --    RDW   --   13 5   --    NEUTROABS   --   4 32   --    NA  138   --    --    K  4 5   --    --    CL  101   --    --    CO2  25   --    --    BUN  11   --    --    CREATININE  0 93   --    --    GLUCOSE  145*   --    --    CALCIUM  8 9   --    --    AST  26   --    --    ALT  26   --    --    ALKPHOS  98   --    --    PROT  8 1   --    --    BILITOT  1 00   --    --    CHOL   --    --   114   HDL   --    --   58   TRIG   --    --   67   LDLCALC   --    --   43   [id number]   --    --   1 949     Admission Labs:   Admission on 03/24/2018   Component Date Value    EXTBreath Alcohol 03/24/2018 neg     Amph/Meth UR 03/24/2018 Negative     Barbiturate Ur 03/24/2018 Negative     Benzodiazepine Urine 03/24/2018 Negative     Cocaine Urine 03/24/2018 Negative     Methadone Urine 03/24/2018 Negative     Opiate Urine 03/24/2018 Negative     PCP Ur 03/24/2018 Negative     THC Urine 03/24/2018 Positive*    WBC 03/24/2018 7 48     RBC 03/24/2018 5 56     Hemoglobin 03/24/2018 16 3     Hematocrit 03/24/2018 46 2     MCV 03/24/2018 83     MCH 03/24/2018 29 3     MCHC 03/24/2018 35 3     RDW 03/24/2018 13 5     MPV 03/24/2018 11 5     Platelets 24/58/6531 240     Neutrophils Relative 03/24/2018 57     Lymphocytes Relative 03/24/2018 32     Monocytes Relative 03/24/2018 10     Eosinophils Relative 03/24/2018 1     Basophils Relative 03/24/2018 0     Neutrophils Absolute 03/24/2018 4 32     Lymphocytes Absolute 03/24/2018 2 36     Monocytes Absolute 03/24/2018 0 73  Eosinophils Absolute 03/24/2018 0 04     Basophils Absolute 03/24/2018 0 03     Sodium 03/24/2018 138     Potassium 03/24/2018 4 5     Chloride 03/24/2018 101     CO2 03/24/2018 25     Anion Gap 03/24/2018 12     BUN 03/24/2018 11     Creatinine 03/24/2018 0 93     Glucose 03/24/2018 145*    Calcium 03/24/2018 8 9     AST 03/24/2018 26     ALT 03/24/2018 26     Alkaline Phosphatase 03/24/2018 98     Total Protein 03/24/2018 8 1     Albumin 03/24/2018 4 3     Total Bilirubin 03/24/2018 1 00     eGFR 03/24/2018 117     Cholesterol 03/26/2018 114     Triglycerides 03/26/2018 67     HDL, Direct 03/26/2018 58     LDL Calculated 03/26/2018 43     TSH 3RD GENERATON 03/26/2018 1 949        Psychiatric Review of Systems:  Behavior over the last 24 hours:  Slow improvement  Sleep: normal  Appetite: normal  Medication side effects: No  ROS: no complaints    Mental Status Evaluation:  Appearance:  casually dressed   Behavior:  guarded   Speech:  soft   Mood:  anxious   Affect:  constricted   Language naming objects and repeating phrases   Thought Process:  disorganized   Thought Content:  delusions  persecutory   Perceptual Disturbances: preoccupied   Risk Potential: Suicidal Ideations without plan, Homicidal Ideations none and Potential for Aggression No   Sensorium:  person, place and time/date   Cognition:  grossly intact   Consciousness:  awake    Attention: attention span appeared shorter than expected for age   Insight:  limited   Judgment: limited   Intellect fair   Gait/Station: normal gait/station   Motor Activity: no abnormal movements     Progress Toward Goals: slow progress    Recommended Treatment:   CT head w/o contrast- to rule out acute intracranial abnormalities  Continue with group therapy, milieu therapy and occupational therapy      Continue following current medications:   Current Facility-Administered Medications:  acetaminophen 650 mg Oral Q6H PRN Ed Manzo MD aluminum-magnesium hydroxide-simethicone 20 mL Oral Q4H PRN Fernanda Fitzpatrick MD   benztropine 1 mg Oral Q6H PRN Fernanda Fitzpatrick MD   haloperidol 5 mg Oral Q6H PRN Fernanda Fitzpatrick MD   haloperidol lactate 5 mg Intramuscular Q6H PRN Fernanda Fitzpatrick MD   hydrOXYzine HCL 25 mg Oral Q6H PRN Fernanda Fitzpatrick MD   ibuprofen 400 mg Oral Q8H PRN Fernanda Fitzpatrick MD   ibuprofen 600 mg Oral Q8H PRN Fernanda Fitzpatrick MD   ibuprofen 800 mg Oral Q8H PRN Fernanda Fitzpatrick MD   influenza vaccine 0 5 mL Intramuscular Prior to discharge Luis Grider   LORazepam 2 mg Intramuscular Q4H PRN Fernanda Fitzpatrick MD   magnesium hydroxide 30 mL Oral Daily PRN Frenanda Fitzpatrick MD   nicotine 1 patch Transdermal Daily Fernanda Fitzpatrick MD   nicotine polacrilex 4 mg Oral Q2H PRN IMELDA Romero   OLANZapine 5 mg Oral HS Luis Grider       Risks, benefits and possible side effects of Medications:   Risks, benefits, and possible side effects of medications explained to patient and patient verbalizes understanding  This note has been constructed using a voice recognition system  There may be translation, syntax,  or grammatical errors  If you have any questions, please contact the dictating provider

## 2018-03-27 NOTE — PROGRESS NOTES
Pt still has scattered thoughts at times but able to carry on conversation, appears to  Be responding at times remains cooperative and pleasant

## 2018-03-27 NOTE — CASE MANAGEMENT
CM and HIEU Kimble met w/ PT today who signed SHASHI for PT's mother  PT signed SHASHI for the following:      Mother- Leroy Machuca located @ [address] [place] [address] ) [phone]
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
## 2018-03-27 NOTE — PROGRESS NOTES
Pt was observed sitting in head holding his ears  Appears anxious and slightly rocking back and forth  This writer approached pt and asked if pt was hearing AH  Pt began whispering to this writer however most of what pt was saying was unable to be heard  Pt then asked if he could walk off the unit to "walk around the hospital or outside of it " pt then pointed to the fire door and states he wanted to push the door  Explained to pt he could not leave unit due to this being a locked unit and could not leave until the Doctor discharges him  Pt states "But I am of right sound mind  I dont like taking orders " Pt refused to step away from the fire door  Began to lightly push into the door and stating "I wont push it all the way until you tell me I can  I wont step away though because I dont answer to orders well " With encouragement, pt finally agreeable to step away from door  Pt then went into room and laid on floor stating he was listening to the voices below us  Pt denied having auditory hallucinations however was whispering to self  Pt then walked to quiet room and became agitated  Refusing PRN medications  Pt took shirt off and began to do push ups and sit ups  Agitated with this writer stating "I wont take meds  I am in the right sound mind!" pt questioning the "chemical make up" of medications  Pt then apologetic for agitation  With much encouragement, pt agreeable to take PRN medications  States "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

## 2018-03-27 NOTE — PROGRESS NOTES
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 " pt observed whispering to self however continues to denies [address] stating "No, I am of right sound mind  Please tell the Doctor this " pt asked this writer to review medications again with pt which was completed  Pt agreeable to start medications stating "Maybe I dont have clear thoughts and maybe I do need some help with that but that doesn't mean I am not in the right frame of mind right?" pt states "I want to keep clean from everything " However when this writer asked pt to clarify, pt states "No nevermind " Pt whispering to self and asking this writer for a response however when this writer explains how he needs to speak up, pt refuses to speak up and states "No, it's ok   I already know "

## 2018-03-27 NOTE — PLAN OF CARE
Problem: Alteration in Thoughts and Perception  Goal: Treatment Goal: Gain control of psychotic behaviors/thinking, reduce/eliminate presenting symptoms and demonstrate improved reality functioning upon discharge  Outcome: Progressing    Goal: Verbalize thoughts and feelings  Interventions:  - Promote a nonjudgmental and trusting relationship with the patient through active listening and therapeutic communication  - Assess patient's level of functioning, behavior and potential for risk  - Engage patient in 1 on 1 interactions for a minimum of 15 minutes each session  - Encourage patient to express fears, feelings, frustrations, and discuss symptoms    - Harris patient to reality, help patient recognize reality-based thinking   - Administer medications as ordered and assess for potential side effects  - Provide the patient education related to the signs and symptoms of the illness and desired effects of prescribed medications   Outcome: Progressing    Goal: Refrain from acting on delusional thinking/internal stimuli  Interventions:  - Monitor patient closely, per order   - Utilize least restrictive measures   - Set reasonable limits, give positive feedback for acceptable   - Administer medications as ordered and monitor of potential side effects   Outcome: Progressing    Goal: Agree to be compliant with medication regime, as prescribed and report medication side effects  Interventions:  - Offer appropriate PRN medication and supervise ingestion; conduct aims, as needed    Outcome: Progressing    Goal: Attend and participate in unit activities, including therapeutic, recreational, and educational groups  Interventions:  - Provide therapeutic and educational activities daily, encourage attendance and participation, and document same in the medical record    Outcome: Progressing    Goal: Recognize dysfunctional thoughts, communicate reality-based thoughts at the time of discharge  Interventions:  - Provide medication and psycho-education to assist patient in compliance and developing insight into his/her illness    Outcome: Progressing    Goal: Complete daily ADLs, including personal hygiene independently, as able  Interventions:  - Observe, teach, and assist patient with ADLS  - Monitor and promote a balance of rest/activity, with adequate nutrition and elimination    Outcome: Progressing      Problem: Ineffective Coping  Goal: Cooperates with admission process  Interventions:   - Complete admission process   Outcome: Progressing    Goal: Identifies ineffective coping skills  Outcome: Progressing    Goal: Identifies healthy coping skills  Outcome: Progressing    Goal: Demonstrates healthy coping skills  Outcome: Progressing    Goal: Participates in unit activities  Interventions:  - Provide therapeutic environment   - Provide required programming   - Redirect inappropriate behaviors    Outcome: Progressing    Goal: Patient/Family participate in treatment and DC plans  Interventions:  - Provide therapeutic environment   Outcome: Progressing    Goal: Patient/Family verbalizes awareness of resources  Outcome: Progressing    Goal: Understands least restrictive measures  Interventions:  - Utilize least restrictive behavior   Outcome: Progressing    Goal: Free from restraint events  - Utilize least restrictive measures   - Provide behavioral interventions   - Redirect inappropriate behaviors    Outcome: Progressing    Goal: Participates in unit activities  Interventions:  - Provide therapeutic environment   - Provide required programming   - Redirect inappropriate behaviors    Outcome: Progressing      Problem: Risk for Self Injury/Neglect  Goal: Treatment Goal: Remain safe during length of stay, learn and adopt new coping skills, and be free of self-injurious ideation, impulses and acts at the time of discharge  Outcome: Progressing    Goal: Verbalize thoughts and feelings  Interventions:  - Assess and re-assess patient's lethality and potential for self-injury  - Engage patient in 1:1 interactions, daily, for a minimum of 15 minutes  - Encourage patient to express feelings, fears, frustrations, hopes  - Establish rapport/trust with patient    Outcome: Progressing    Goal: Refrain from harming self  Interventions:  - Monitor patient closely, per order  - Develop a trusting relationship  - Supervise medication ingestion, monitor effects and side effects    Outcome: Progressing    Goal: Attend and participate in unit activities, including therapeutic, recreational, and educational groups  Interventions:  - Provide therapeutic and educational activities daily, encourage attendance and participation, and document same in the medical record  - Obtain collateral information, encourage visitation and family involvement in care    Outcome: Progressing    Goal: Recognize maladaptive responses and adopt new coping mechanisms  Outcome: Progressing    Goal: Complete daily ADLs, including personal hygiene independently, as able  Interventions:  - Observe, teach, and assist patient with ADLS  - Monitor and promote a balance of rest/activity, with adequate nutrition and elimination   Outcome: Progressing      Problem: Depression  Goal: Treatment Goal: Demonstrate behavioral control of depressive symptoms, verbalize feelings of improved mood/affect, and adopt new coping skills prior to discharge  Outcome: Progressing    Goal: Verbalize thoughts and feelings  Interventions:  - Assess and re-assess patient's level of risk   - Engage patient in 1:1 interactions, daily, for a minimum of 15 minutes   - Encourage patient to express feelings, fears, frustrations, hopes    Outcome: Progressing    Goal: Refrain from harming self  Interventions:  - Monitor patient closely, per order   - Supervise medication ingestion, monitor effects and side effects    Outcome: Progressing    Goal: Refrain from isolation  Interventions:  - Develop a trusting relationship   - Encourage socialization    Outcome: Progressing    Goal: Refrain from self-neglect  Outcome: Progressing    Goal: Attend and participate in unit activities, including therapeutic, recreational, and educational groups  Interventions:  - Provide therapeutic and educational activities daily, encourage attendance and participation, and document same in the medical record    Outcome: Progressing    Goal: Complete daily ADLs, including personal hygiene independently, as able  Interventions:  - Observe, teach, and assist patient with ADLS  -  Monitor and promote a balance of rest/activity, with adequate nutrition and elimination    Outcome: Progressing

## 2018-03-27 NOTE — PLAN OF CARE
Problem: Ineffective Coping  Goal: Participates in unit activities  Interventions:  - Provide therapeutic environment   - Provide required programming   - Redirect inappropriate behaviors    Outcome: Progressing  Pt attends groups with prompting  He remains mostly seclusive to self and often leaves shortly after group begins when he does not care for the activity being presented

## 2018-03-28 PROCEDURE — 99232 SBSQ HOSP IP/OBS MODERATE 35: CPT | Performed by: PSYCHIATRY & NEUROLOGY

## 2018-03-28 RX ORDER — OLANZAPINE 5 MG/1
7.5 TABLET, ORALLY DISINTEGRATING ORAL
Status: DISCONTINUED | OUTPATIENT
Start: 2018-03-28 | End: 2018-04-01

## 2018-03-28 RX ADMIN — OLANZAPINE 7.5 MG: 5 TABLET, ORALLY DISINTEGRATING ORAL at 21:23

## 2018-03-28 NOTE — CASE MANAGEMENT
PT has primary private health insurance of [employer] and secondary [facility]  CM outreached to [facility] located @ [address] [place] [address], [place], [address] [place] [address] ) [phone] (9 mins from PT's house) and spoke w/ Lonza Lover who confirms that they take both insurances; however, reports that they have a 2 month waiting list at this time

## 2018-03-28 NOTE — PLAN OF CARE
Problem: Alteration in Thoughts and Perception  Goal: Refrain from acting on delusional thinking/internal stimuli  Interventions:  - Monitor patient closely, per order   - Utilize least restrictive measures   - Set reasonable limits, give positive feedback for acceptable   - Administer medications as ordered and monitor of potential side effects   Outcome: Progressing    Goal: Agree to be compliant with medication regime, as prescribed and report medication side effects  Interventions:  - Offer appropriate PRN medication and supervise ingestion; conduct aims, as needed    Outcome: Progressing    Goal: Attend and participate in unit activities, including therapeutic, recreational, and educational groups  Interventions:  - Provide therapeutic and educational activities daily, encourage attendance and participation, and document same in the medical record    Outcome: Progressing    Goal: Recognize dysfunctional thoughts, communicate reality-based thoughts at the time of discharge  Interventions:  - Provide medication and psycho-education to assist patient in compliance and developing insight into his/her illness    Outcome: Progressing    Goal: Complete daily ADLs, including personal hygiene independently, as able  Interventions:  - Observe, teach, and assist patient with ADLS  - Monitor and promote a balance of rest/activity, with adequate nutrition and elimination    Outcome: Progressing      Problem: Ineffective Coping  Goal: Cooperates with admission process  Interventions:   - Complete admission process   Outcome: Progressing    Goal: Identifies ineffective coping skills  Outcome: Progressing    Goal: Identifies healthy coping skills  Outcome: Progressing    Goal: Demonstrates healthy coping skills  Outcome: Progressing    Goal: Participates in unit activities  Interventions:  - Provide therapeutic environment   - Provide required programming   - Redirect inappropriate behaviors    Outcome: Progressing    Goal: Patient/Family participate in treatment and DC plans  Interventions:  - Provide therapeutic environment   Outcome: Progressing    Goal: Patient/Family verbalizes awareness of resources  Outcome: Progressing    Goal: Understands least restrictive measures  Interventions:  - Utilize least restrictive behavior   Outcome: Progressing    Goal: Free from restraint events  - Utilize least restrictive measures   - Provide behavioral interventions   - Redirect inappropriate behaviors    Outcome: Progressing    Goal: Participates in unit activities  Interventions:  - Provide therapeutic environment   - Provide required programming   - Redirect inappropriate behaviors    Outcome: Progressing      Problem: Risk for Self Injury/Neglect  Goal: Treatment Goal: Remain safe during length of stay, learn and adopt new coping skills, and be free of self-injurious ideation, impulses and acts at the time of discharge  Outcome: Progressing    Goal: Verbalize thoughts and feelings  Interventions:  - Assess and re-assess patient's lethality and potential for self-injury  - Engage patient in 1:1 interactions, daily, for a minimum of 15 minutes  - Encourage patient to express feelings, fears, frustrations, hopes  - Establish rapport/trust with patient    Outcome: Progressing    Goal: Refrain from harming self  Interventions:  - Monitor patient closely, per order  - Develop a trusting relationship  - Supervise medication ingestion, monitor effects and side effects    Outcome: Progressing    Goal: Attend and participate in unit activities, including therapeutic, recreational, and educational groups  Interventions:  - Provide therapeutic and educational activities daily, encourage attendance and participation, and document same in the medical record  - Obtain collateral information, encourage visitation and family involvement in care    Outcome: Progressing    Goal: Recognize maladaptive responses and adopt new coping mechanisms  Outcome: Progressing    Goal: Complete daily ADLs, including personal hygiene independently, as able  Interventions:  - Observe, teach, and assist patient with ADLS  - Monitor and promote a balance of rest/activity, with adequate nutrition and elimination   Outcome: Progressing      Problem: Depression  Goal: Treatment Goal: Demonstrate behavioral control of depressive symptoms, verbalize feelings of improved mood/affect, and adopt new coping skills prior to discharge  Outcome: Progressing    Goal: Verbalize thoughts and feelings  Interventions:  - Assess and re-assess patient's level of risk   - Engage patient in 1:1 interactions, daily, for a minimum of 15 minutes   - Encourage patient to express feelings, fears, frustrations, hopes    Outcome: Progressing    Goal: Refrain from harming self  Interventions:  - Monitor patient closely, per order   - Supervise medication ingestion, monitor effects and side effects    Outcome: Progressing    Goal: Refrain from isolation  Interventions:  - Develop a trusting relationship   - Encourage socialization    Outcome: Progressing    Goal: Refrain from self-neglect  Outcome: Progressing    Goal: Attend and participate in unit activities, including therapeutic, recreational, and educational groups  Interventions:  - Provide therapeutic and educational activities daily, encourage attendance and participation, and document same in the medical record    Outcome: Progressing    Goal: Complete daily ADLs, including personal hygiene independently, as able  Interventions:  - Observe, teach, and assist patient with ADLS  -  Monitor and promote a balance of rest/activity, with adequate nutrition and elimination    Outcome: Progressing      Problem: Anxiety  Goal: Anxiety is at manageable level  Interventions:  - Assess and monitor patient's anxiety level     - Monitor for signs and symptoms of anxiety both physical and emotional (heart palpitations, chest pain, shortness of breath, headaches, nausea, feeling jumpy, restlessness, irritable, apprehensive)  - Collaborate with interdisciplinary team and initiate plan and interventions as ordered    - [place] patient to unit/surroundings  - Explain treatment plan  - Encourage participation in care  - Encourage verbalization of concerns/fears  - Identify coping mechanisms  - Assist in developing anxiety-reducing skills  - Administer/offer alternative therapies  - Limit or eliminate stimulants   Outcome: Progressing

## 2018-03-28 NOTE — PROGRESS NOTES
Patient out in the milieu, communicating with his peers  cooperatve and pleasant ; however, at times he does appears to be responding to internal stimuli  Denies HIs or SIs  Will continue to monitor

## 2018-03-28 NOTE — CASE MANAGEMENT
CM met w/ PT today  PT verbalized that he is feeling better and described himself as more clam since being on his current medication regimen  PT reports that he notices an increase in his sleep; however, communicates that he believes he is getting caught up on sleep due to not sleeping for so long prior to coming into the hospital  PT reports that his mother came to visit last evening and PT reports that the visit went well  PT reports that he is feeling that he is making progress and also struggling w/ the idea of the medication helping his symptoms  PT communicates concerns regarding his experiences and struggles to understanding his symptoms and diagnosis  PT hopeful to be able to return to work and reports strong desire to 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  Discussed PT's progression in stabilization and provided psychoeducation  PT is now agreeable to signing SHASHI's for his PCP and his O/P therapist  PT denies remembering the therapist practice; however, communicates that his mother will know  PT requested that CM outreach to PT's mother to obtain this information  CM outreached to PT's Mother- Obey Sylvester located @ [address] [place] [phone] (f) [phone]  Crescencio Hook communicated that PT was seeing a therapist named Estela through [facility] [address] in [place], [place]  PT communicated that Estela saw PT only 2x and the telephone number is [phone]) [phone]  CM looked this up online while on the telephone w/ Crescencio Hook and CM was unable to find the practice  Crescencio Hook communicated that this therapist doesn't take PT's current insurance and Crescencio Hook is paying out of pocket for this and Crescencio Hook cannot continue to afford this at this time  Crescencio Hook is requesting that PT be referred to a new therapist/ psychiatrist  CM agreed to review this w/ PT and based upon PT's decision CM will make referrals accordingly  Crescencio Hook communicated that she came to visit PT last night   Crescencio Hook reports that PT was tearful and cried a lot during conversation  [facility] reports that PT was struggling last evening and PT's brother came to visit last night  [facility] reports that overall the visit went well thought  [facility] reports that PT's Aunt Liilbeth and Leonel Gates are planning on coming to visit PT this evening  [facility] reports that PT enjoys snacks and they plan on bringing PT some snacks tonedith  [facility] is interested in the results of PT's CT scan  CM spoke w/ MD regarding this who communicated that the results came back fine  MD requested CM inform [facility] of this which CM did  [facility] communicated being happy that there doesn't appear to be anything medically indicating PT's symptom presentation and then stated, "I guess I don't know what's worse, if it was a tumor you take it out and all of this could go away, now I don't know " VISHAL provided extensive psychoeducation to [facility] and encouraged [facility] to outreach for clinical services and supports as well as review MELISSA information  Agreed to be in contact regarding progression in stabilization and D/C planning       PT signed SHASHI's for the following:     PCP- [facility] located @ [address] [place] [address] Candido BARRAGAN 84 (r) [phone]

## 2018-03-28 NOTE — PROGRESS NOTES
Progress Note - Behavioral Health   Yesenia Vazquez 26 y o  male MRN: [id number]  Unit/Bed#: [facility] 254-01 Encounter: [id number]    Assessment/Plan   Principal Problem:    Major depressive disorder with psychotic features ([facility] [address] )      Subjective:  Patient remained compliant with medications with no acute side effects  Patient does report mild early morning sedation but was later seen out in milieu today interacting with selective peers and attend groups  Patient remained guarded and paranoid but less preoccupied after addition of olanzapine  We discussed the normal CT head finding and lab results indicating underlying mood and psychotic disorder  Patient did got little anxious but is willing to work with primary team recommendation  He is consenting for safety on the unit  Current Medications:    Current Facility-Administered Medications:  acetaminophen 650 mg Oral Q6H PRN Liliane Middleton MD   aluminum-magnesium hydroxide-simethicone 20 mL Oral Q4H PRN Liliane Middleton MD   benztropine 1 mg Oral Q6H PRN Liliane Middleton MD   haloperidol 5 mg Oral Q6H PRN Liliane Middleton MD   haloperidol lactate 5 mg Intramuscular Q6H PRN Liliane Middleton MD   hydrOXYzine HCL 25 mg Oral Q6H PRN Liliane Middleton MD   ibuprofen 400 mg Oral Q8H PRN Liliane Middleton MD   ibuprofen 600 mg Oral Q8H PRN Liliane Middleton MD   ibuprofen 800 mg Oral Q8H PRN Liliane Middleton MD   influenza vaccine 0 5 mL Intramuscular Prior to discharge Luis Grider   LORazepam 2 mg Intramuscular Q4H PRN Liliane Middleton MD   magnesium hydroxide 30 mL Oral Daily PRN Liliane Middleton MD   nicotine 1 patch Transdermal Daily Liliane Middleton MD   nicotine polacrilex 4 mg Oral Q2H PRN IMELDA Butler   OLANZapine 7 5 mg Oral HS [address] [facility] Medications: all current active meds have been reviewed      Vital signs in last 24 hours:  Temp:  [96 1 °F (35 6 °C)-96 5 °F (35 8 °C)] 96 5 °F (35 8 °C)  HR:  [89-94] 94  Resp:  [16-17] 17  BP: (110-118)/(57-67) 110/67    Laboratory results:    I have personally reviewed all pertinent laboratory/tests results    Labs in last 72 hours:   Recent Labs      03/26/18   0614   CHOL  114   HDL  58   TRIG  67   LDLCALC  43   [id number]  1 949     Admission Labs:   Admission on 03/24/2018   Component Date Value    EXTBreath Alcohol 03/24/2018 neg     Amph/Meth UR 03/24/2018 Negative     Barbiturate Ur 03/24/2018 Negative     Benzodiazepine Urine 03/24/2018 Negative     Cocaine Urine 03/24/2018 Negative     Methadone Urine 03/24/2018 Negative     Opiate Urine 03/24/2018 Negative     PCP Ur 03/24/2018 Negative     THC Urine 03/24/2018 Positive*    WBC 03/24/2018 7 48     RBC 03/24/2018 5 56     Hemoglobin 03/24/2018 16 3     Hematocrit 03/24/2018 46 2     MCV 03/24/2018 83     MCH 03/24/2018 29 3     MCHC 03/24/2018 35 3     RDW 03/24/2018 13 5     MPV 03/24/2018 11 5     Platelets 08/59/4533 240     Neutrophils Relative 03/24/2018 57     Lymphocytes Relative 03/24/2018 32     Monocytes Relative 03/24/2018 10     Eosinophils Relative 03/24/2018 1     Basophils Relative 03/24/2018 0     Neutrophils Absolute 03/24/2018 4 32     Lymphocytes Absolute 03/24/2018 2 36     Monocytes Absolute 03/24/2018 0 73     Eosinophils Absolute 03/24/2018 0 04     Basophils Absolute 03/24/2018 0 03     Sodium 03/24/2018 138     Potassium 03/24/2018 4 5     Chloride 03/24/2018 101     CO2 03/24/2018 25     Anion Gap 03/24/2018 12     BUN 03/24/2018 11     Creatinine 03/24/2018 0 93     Glucose 03/24/2018 145*    Calcium 03/24/2018 8 9     AST 03/24/2018 26     ALT 03/24/2018 26     Alkaline Phosphatase 03/24/2018 98     Total Protein 03/24/2018 8 1     Albumin 03/24/2018 4 3     Total Bilirubin 03/24/2018 1 00     eGFR 03/24/2018 117     Cholesterol 03/26/2018 114     Triglycerides 03/26/2018 67     HDL, Direct 03/26/2018 58     LDL Calculated 03/26/2018 43     TSH 3RD GENERATON 03/26/2018 1 949     Clarity, UA 03/27/2018 Clear     Color, UA 03/27/2018 Yellow     Specific Gravity, UA 03/27/2018 1 025     pH, UA 03/27/2018 6 0     Glucose, UA 03/27/2018 Negative     Ketones, UA 03/27/2018 Negative     Blood, UA 03/27/2018 Negative     Protein, UA 03/27/2018 Negative     Nitrite, UA 03/27/2018 Negative     Bilirubin, UA 03/27/2018 Negative     Urobilinogen, UA 03/27/2018 0 2     Leukocytes, UA 03/27/2018 Negative     WBC, UA 03/27/2018 1-2*    RBC, UA 03/27/2018 1-2*    Bacteria, UA 03/27/2018 None Seen     Epithelial Cells 03/27/2018 Occasional        Psychiatric Review of Systems:  Behavior over the last 24 hours:  unchanged  Sleep: normal  Appetite: normal  Medication side effects: Yes mild sedation in AM  ROS: no complaints    Mental Status Evaluation:  Appearance:  casually dressed   Behavior:  guarded   Speech:  soft   Mood:  angry, anxious and depressed   Affect:  constricted   Language naming objects and repeating phrases   Thought Process:  circumstantial and disorganized   Thought Content:  delusions  persecutory   Perceptual Disturbances: less preoccupied   Risk Potential: Suicidal Ideations without plan, Homicidal Ideations none and Potential for Aggression No   Sensorium:  person and place   Cognition:  grossly intact   Consciousness:  awake    Attention: attention span appeared shorter than expected for age   Insight:  limited   Judgment: limited   Intellect fair   Gait/Station: normal gait/station   Motor Activity: no abnormal movements     Progress Toward Goals: slow progress    Recommended Treatment:   Increase Zydis to 7 5 mg at HS for psychosis and mood stabilization  Continue with group therapy, milieu therapy and occupational therapy      Continue following current medications:   Current Facility-Administered Medications:  acetaminophen 650 mg Oral Q6H PRN Carlos Cm MD aluminum-magnesium hydroxide-simethicone 20 mL Oral Q4H PRN Marilu Ramsey MD   benztropine 1 mg Oral Q6H PRN Marilu Ramsey MD   haloperidol 5 mg Oral Q6H PRN Marilu Ramsey MD   haloperidol lactate 5 mg Intramuscular Q6H PRN Marilu Ramsey MD   hydrOXYzine HCL 25 mg Oral Q6H PRN Marilu Ramsey MD   ibuprofen 400 mg Oral Q8H PRN Marilu Ramsey MD   ibuprofen 600 mg Oral Q8H PRN Marilu Ramsey MD   ibuprofen 800 mg Oral Q8H PRN Marilu Ramsey MD   influenza vaccine 0 5 mL Intramuscular Prior to discharge Luis Grider   LORazepam 2 mg Intramuscular Q4H PRN Marilu Ramsey MD   magnesium hydroxide 30 mL Oral Daily PRN Marilu Ramsey MD   nicotine 1 patch Transdermal Daily Marilu Ramsey MD   nicotine polacrilex 4 mg Oral Q2H PRN IMELDA Che   OLANZapine 7 5 mg Oral HS Luis Grider       Risks, benefits and possible side effects of Medications:   Risks, benefits, and possible side effects of medications explained to patient and patient verbalizes understanding  This note has been constructed using a voice recognition system  There may be translation, syntax,  or grammatical errors  If you have any questions, please contact the dictating provider

## 2018-03-29 PROBLEM — F25.1 SCHIZOAFFECTIVE DISORDER, DEPRESSIVE TYPE (HCC): Status: ACTIVE | Noted: 2018-03-25

## 2018-03-29 PROCEDURE — 99232 SBSQ HOSP IP/OBS MODERATE 35: CPT | Performed by: PSYCHIATRY & NEUROLOGY

## 2018-03-29 RX ADMIN — OLANZAPINE 7.5 MG: 5 TABLET, ORALLY DISINTEGRATING ORAL at 21:37

## 2018-03-29 NOTE — PLAN OF CARE
Problem: Alteration in Thoughts and Perception  Goal: Treatment Goal: Gain control of psychotic behaviors/thinking, reduce/eliminate presenting symptoms and demonstrate improved reality functioning upon discharge  Outcome: Progressing    Goal: Verbalize thoughts and feelings  Interventions:  - Promote a nonjudgmental and trusting relationship with the patient through active listening and therapeutic communication  - Assess patient's level of functioning, behavior and potential for risk  - Engage patient in 1 on 1 interactions for a minimum of 15 minutes each session  - Encourage patient to express fears, feelings, frustrations, and discuss symptoms    - Mobile patient to reality, help patient recognize reality-based thinking   - Administer medications as ordered and assess for potential side effects  - Provide the patient education related to the signs and symptoms of the illness and desired effects of prescribed medications   Outcome: Progressing    Goal: Refrain from acting on delusional thinking/internal stimuli  Interventions:  - Monitor patient closely, per order   - Utilize least restrictive measures   - Set reasonable limits, give positive feedback for acceptable   - Administer medications as ordered and monitor of potential side effects   Outcome: Progressing    Goal: Agree to be compliant with medication regime, as prescribed and report medication side effects  Interventions:  - Offer appropriate PRN medication and supervise ingestion; conduct aims, as needed    Outcome: Progressing    Goal: Attend and participate in unit activities, including therapeutic, recreational, and educational groups  Interventions:  - Provide therapeutic and educational activities daily, encourage attendance and participation, and document same in the medical record    Outcome: Progressing    Goal: Recognize dysfunctional thoughts, communicate reality-based thoughts at the time of discharge  Interventions:  - Provide medication and psycho-education to assist patient in compliance and developing insight into his/her illness    Outcome: Progressing    Goal: Complete daily ADLs, including personal hygiene independently, as able  Interventions:  - Observe, teach, and assist patient with ADLS  - Monitor and promote a balance of rest/activity, with adequate nutrition and elimination    Outcome: Progressing      Problem: Ineffective Coping  Goal: Cooperates with admission process  Interventions:   - Complete admission process   Outcome: Progressing    Goal: Identifies ineffective coping skills  Outcome: Progressing    Goal: Identifies healthy coping skills  Outcome: Progressing    Goal: Demonstrates healthy coping skills  Outcome: Progressing    Goal: Participates in unit activities  Interventions:  - Provide therapeutic environment   - Provide required programming   - Redirect inappropriate behaviors    Outcome: Progressing    Goal: Patient/Family participate in treatment and DC plans  Interventions:  - Provide therapeutic environment   Outcome: Progressing    Goal: Patient/Family verbalizes awareness of resources  Outcome: Progressing    Goal: Understands least restrictive measures  Interventions:  - Utilize least restrictive behavior   Outcome: Progressing    Goal: Free from restraint events  - Utilize least restrictive measures   - Provide behavioral interventions   - Redirect inappropriate behaviors    Outcome: Progressing    Goal: Participates in unit activities  Interventions:  - Provide therapeutic environment   - Provide required programming   - Redirect inappropriate behaviors    Outcome: Progressing      Problem: Risk for Self Injury/Neglect  Goal: Treatment Goal: Remain safe during length of stay, learn and adopt new coping skills, and be free of self-injurious ideation, impulses and acts at the time of discharge  Outcome: Progressing    Goal: Verbalize thoughts and feelings  Interventions:  - Assess and re-assess patient's lethality and potential for self-injury  - Engage patient in 1:1 interactions, daily, for a minimum of 15 minutes  - Encourage patient to express feelings, fears, frustrations, hopes  - Establish rapport/trust with patient    Outcome: Progressing    Goal: Refrain from harming self  Interventions:  - Monitor patient closely, per order  - Develop a trusting relationship  - Supervise medication ingestion, monitor effects and side effects    Outcome: Progressing    Goal: Attend and participate in unit activities, including therapeutic, recreational, and educational groups  Interventions:  - Provide therapeutic and educational activities daily, encourage attendance and participation, and document same in the medical record  - Obtain collateral information, encourage visitation and family involvement in care    Outcome: Progressing    Goal: Recognize maladaptive responses and adopt new coping mechanisms  Outcome: Progressing    Goal: Complete daily ADLs, including personal hygiene independently, as able  Interventions:  - Observe, teach, and assist patient with ADLS  - Monitor and promote a balance of rest/activity, with adequate nutrition and elimination   Outcome: Progressing      Problem: Depression  Goal: Treatment Goal: Demonstrate behavioral control of depressive symptoms, verbalize feelings of improved mood/affect, and adopt new coping skills prior to discharge  Outcome: Progressing    Goal: Verbalize thoughts and feelings  Interventions:  - Assess and re-assess patient's level of risk   - Engage patient in 1:1 interactions, daily, for a minimum of 15 minutes   - Encourage patient to express feelings, fears, frustrations, hopes    Outcome: Progressing    Goal: Refrain from harming self  Interventions:  - Monitor patient closely, per order   - Supervise medication ingestion, monitor effects and side effects    Outcome: Progressing    Goal: Refrain from isolation  Interventions:  - Develop a trusting relationship   - Encourage socialization    Outcome: Progressing    Goal: Refrain from self-neglect  Outcome: Progressing

## 2018-03-29 NOTE — PROGRESS NOTES
Pt states that he slept well but is exhausted  He rates anxiety and depression as a "2 " He denies S/H/I  Denies AVH, but appears preoccupied  Pt is visible on the unit, seclusive to self most of the time  Will continue to monitor, provide support and encouragement

## 2018-03-29 NOTE — CASE MANAGEMENT
CM received a voice-mail from [address] @ [address] [facility] [address] located @ [address] [place] [address] (Y) [phone] (E) [phone] communicating that they do accept some private insurances; however, unsure about PT's  Madisyn Zhao communicated that if PT wants a referral for PHP, CM can fax over the referral to review  Madisyn Zhao communicated that if PT's primary private insurance is not in network, PT isn't able to use his secondary MA for PHP as the payment has to first go through the primary insurance

## 2018-03-29 NOTE — CASE MANAGEMENT
CM met w/ PT today  PT reports that his Aunt and Uncle came to visit him last night  PT verbalized that this was a good visit and that he was really appreciative of them coming to visit him  PT communicated that he spoke w/ MH about PHP verses O/P  PT communicated that at first he declined PHP; however, is now willing to consider it  PT communicated that for right now, PT would like CM to explore O/P therapy and psychiatry  PT communicated that he is open to seeing a new therapist and psychiatrist that are in network w/ PT's insurance rather than staying w/ his current therapist who is out-of-network w/ PT's therapist  CM agreed to print out information on local PHP programs for PT to review  PT communicated that he will review them and follow up w/ CM closer to projected D/C date  Agreed to be in contact regarding this

## 2018-03-29 NOTE — CASE MANAGEMENT
CM outreached to [facility] located @ [address] [place] [address] (Z) [phone] (t) [phone] and left a voice-mail for the Director Winnie Sanchez inquiring about whether or not PT is eligible for PHP based upon insurance  CM printed out a copy of [facility] brochure to provide to PT once CM gets clarity from Winnie Sanchez as to whether or not PT is eligible

## 2018-03-29 NOTE — PROGRESS NOTES
Progress Note - Behavioral Health   Darrell Bailey 26 y o  male MRN: [id number]  Unit/Bed#: [facility] 254-01 Encounter: [id number]    Assessment/Plan   Principal Problem:    Major depressive disorder with psychotic features ([address] )      Subjective:  Patient is compliant with medications with no acute side effects  Patient is no longer having early morning sedation and have improved affect during entire evaluation  He is seen more out in milieu today attending all groups  He remained appropriate during entire evaluation  Patient is less guarded and not preoccupied today  We discussed the option of stepping him down to partial outpatient program-but patient is not interested in this option and wants to follow up with outpatient psychotherapist and psychiatrist at this time  He is consenting for safety on the unit  Current Medications:    Current Facility-Administered Medications:  acetaminophen 650 mg Oral Q6H PRN Howard Fong MD   aluminum-magnesium hydroxide-simethicone 20 mL Oral Q4H PRN Howard Fong MD   benztropine 1 mg Oral Q6H PRN Howard Fong MD   haloperidol 5 mg Oral Q6H PRN Howard Fong MD   haloperidol lactate 5 mg Intramuscular Q6H PRN Howard Fong MD   hydrOXYzine HCL 25 mg Oral Q6H PRN Howard Fong MD   ibuprofen 400 mg Oral Q8H PRN Howard Fong MD   ibuprofen 600 mg Oral Q8H PRN Howard Fong MD   ibuprofen 800 mg Oral Q8H PRN Howard Fong MD   influenza vaccine 0 5 mL Intramuscular Prior to discharge Luis Grider   LORazepam 2 mg Intramuscular Q4H PRN Howard Fong MD   magnesium hydroxide 30 mL Oral Daily PRN Howard Fong MD   nicotine 1 patch Transdermal Daily Howard Fong MD   nicotine polacrilex 4 mg Oral Q2H PRN Adina Aase Macritchie, CRNP   OLANZapine 7 5 mg Oral HS [address] [facility] [address] Medications: all current active meds have been reviewed      Vital signs in last 24 hours: Temp:  [96 8 °F (36 °C)-97 8 °F (36 6 °C)] 97 8 °F (36 6 °C)  HR:  [89] 89  Resp:  [15-16] 16  BP: (121-125)/(56-70) 121/56    Laboratory results:    I have personally reviewed all pertinent laboratory/tests results  Labs in last 72 hours: No results for input(s): WBC, RBC, HGB, HCT, PLT, RDW, NEUTROABS, NA, K, CL, CO2, BUN, CREATININE, GLUCOSE, CALCIUM, AST, ALT, ALKPHOS, PROT, ALBUMIN, BILITOT, CHOL, HDL, TRIG, LDLCALC, VALPROICTOT, CARBAMAZEPIN, LITHIUM, AMMONIA, [id number], FREET4, T3FREE, PREGTESTUR, PREGSERUM, HCG, HCGQUANT, RPR in the last 72 hours      Invalid input(s):  RBC  Admission Labs:   Admission on 03/24/2018   Component Date Value    EXTBreath Alcohol 03/24/2018 neg     Amph/Meth UR 03/24/2018 Negative     Barbiturate Ur 03/24/2018 Negative     Benzodiazepine Urine 03/24/2018 Negative     Cocaine Urine 03/24/2018 Negative     Methadone Urine 03/24/2018 Negative     Opiate Urine 03/24/2018 Negative     PCP Ur 03/24/2018 Negative     THC Urine 03/24/2018 Positive*    WBC 03/24/2018 7 48     RBC 03/24/2018 5 56     Hemoglobin 03/24/2018 16 3     Hematocrit 03/24/2018 46 2     MCV 03/24/2018 83     MCH 03/24/2018 29 3     MCHC 03/24/2018 35 3     RDW 03/24/2018 13 5     MPV 03/24/2018 11 5     Platelets 52/50/5014 240     Neutrophils Relative 03/24/2018 57     Lymphocytes Relative 03/24/2018 32     Monocytes Relative 03/24/2018 10     Eosinophils Relative 03/24/2018 1     Basophils Relative 03/24/2018 0     Neutrophils Absolute 03/24/2018 4 32     Lymphocytes Absolute 03/24/2018 2 36     Monocytes Absolute 03/24/2018 0 73     Eosinophils Absolute 03/24/2018 0 04     Basophils Absolute 03/24/2018 0 03     Sodium 03/24/2018 138     Potassium 03/24/2018 4 5     Chloride 03/24/2018 101     CO2 03/24/2018 25     Anion Gap 03/24/2018 12     BUN 03/24/2018 11     Creatinine 03/24/2018 0 93     Glucose 03/24/2018 145*    Calcium 03/24/2018 8 9     AST 03/24/2018 26     ALT 03/24/2018 26     Alkaline Phosphatase 03/24/2018 98     Total Protein 03/24/2018 8 1     Albumin 03/24/2018 4 3     Total Bilirubin 03/24/2018 1 00     eGFR 03/24/2018 117     Cholesterol 03/26/2018 114     Triglycerides 03/26/2018 67     HDL, Direct 03/26/2018 58     LDL Calculated 03/26/2018 43     TSH 3RD GENERATON 03/26/2018 1 949     Clarity, UA 03/27/2018 Clear     Color, UA 03/27/2018 Yellow     Specific Gravity, UA 03/27/2018 1 025     pH, UA 03/27/2018 6 0     Glucose, UA 03/27/2018 Negative     Ketones, UA 03/27/2018 Negative     Blood, UA 03/27/2018 Negative     Protein, UA 03/27/2018 Negative     Nitrite, UA 03/27/2018 Negative     Bilirubin, UA 03/27/2018 Negative     Urobilinogen, UA 03/27/2018 0 2     Leukocytes, UA 03/27/2018 Negative     WBC, UA 03/27/2018 1-2*    RBC, UA 03/27/2018 1-2*    Bacteria, UA 03/27/2018 None Seen     Epithelial Cells 03/27/2018 Occasional        Psychiatric Review of Systems:  Behavior over the last 24 hours:  improving  Sleep: normal  Appetite: normal  Medication side effects: No  ROS: no complaints    Mental Status Evaluation:  Appearance:  casually dressed   Behavior:  guarded   Speech:  soft   Mood:  anxious   Affect:  constricted   Language naming objects   Thought Process:  circumstantial   Thought Content:  obsessions; less preoccupied   Perceptual Disturbances: None   Risk Potential: Suicidal Ideations without plan, Homicidal Ideations none and Potential for Aggression No   Sensorium:  person, place and time/date   Cognition:  grossly intact   Consciousness:  awake    Attention: attention span appeared shorter than expected for age   Insight:  fair   Judgment: fair   Intellect fair   Gait/Station: normal gait/station   Motor Activity: no abnormal movements     Progress Toward Goals: slow progress    Recommended Treatment:   Continue with group therapy, milieu therapy and occupational therapy      Continue following current medications:   Current Facility-Administered Medications:  acetaminophen 650 mg Oral Q6H PRN Sabrina Mishra MD   aluminum-magnesium hydroxide-simethicone 20 mL Oral Q4H PRN Sabrina Mishra MD   benztropine 1 mg Oral Q6H PRN Sabrina Mishra MD   haloperidol 5 mg Oral Q6H PRN Sabrina Mishra MD   haloperidol lactate 5 mg Intramuscular Q6H PRN Sabrina Mishra MD   hydrOXYzine HCL 25 mg Oral Q6H PRN Sabrina Mishra MD   ibuprofen 400 mg Oral Q8H PRN Sabrina Mishra MD   ibuprofen 600 mg Oral Q8H PRN Sabrina Mishra MD   ibuprofen 800 mg Oral Q8H PRN Sabrina Mishra MD   influenza vaccine 0 5 mL Intramuscular Prior to discharge Luis Grider   LORazepam 2 mg Intramuscular Q4H PRN Sabrina Mishra MD   magnesium hydroxide 30 mL Oral Daily PRN Sabrina Misrha MD   nicotine 1 patch Transdermal Daily Sabrina Mishra MD   nicotine polacrilex 4 mg Oral Q2H PRN IMELDA Box   OLANZapine 7 5 mg Oral HS Luis Grider       Risks, benefits and possible side effects of Medications:   Risks, benefits, and possible side effects of medications explained to patient and patient verbalizes understanding  This note has been constructed using a voice recognition system  There may be translation, syntax,  or grammatical errors  If you have any questions, please contact the dictating provider

## 2018-03-29 NOTE — CASE MANAGEMENT
CM received a telephone call from PT's Mother- [place] Shrestha located @ [address] [facility] [address] (Y) [phone] requesting a call back  CM outreached to PT's Mother- [place] Shrestha located @ [address] [facility] [address] (L) [phone] and provided an extensive update regarding PT's progression in stabilization  Discussed PT being open to referrals for a new male therapist and psychiatrist  Discussed the possibility of exploring PHP programming as well  VISHAL clarified that [facility] only has D&A PHP and Christian Melvin is not in support of [facility] as per Christian Melvin she has not heard great things about their program  Discussed possibility of [facility]  Dicussed that CM is trying to refer PT to a place that accepts both of PT's insurances that way PT will not have to worry about a co-pay  Christian Melvin communicated that even if CM finds PT a therapist through the private insurance that doesn't accept PT's MA she is fine paying PT's co-pays  Christian Melvin plans to e-mail CM a list of places that she has looked into  [facility] also reports that PT's brother plans to visit PT this evening  Agreed to be in contact regarding progression in stabilization and D/C planning

## 2018-03-29 NOTE — CASE MANAGEMENT
Discussed in rounds possibility of PT stepping down to PHP  MD spoke w/ PT about this  At this time, PT is declining PHP and only wants to see O/P therapist and psychiatrist      [facility] outreached to [facility] AT [facility] located @ [address] [place] [address] (p) [phone] and spoke w/ Patricia Galeazzi who communicated that they do not schedule in-take/ assessments; however they have Open Access Walk-Ins for assessments on Monday, Wednesday, and Friday 9:00am-2:00pm and Tuesday and Thursday 9:00am-6:00pm  Patricia Galeazzi did communicated that at this time, they are not accepting new patients with private health insurance  Per Patricia Galeazzi, PT cannot be seen at their practice due to private health insurance, despite PT having secondary MA

## 2018-03-30 PROCEDURE — 99232 SBSQ HOSP IP/OBS MODERATE 35: CPT | Performed by: PSYCHIATRY & NEUROLOGY

## 2018-03-30 RX ADMIN — OLANZAPINE 7.5 MG: 5 TABLET, ORALLY DISINTEGRATING ORAL at 21:53

## 2018-03-30 NOTE — PROGRESS NOTES
Pt calm throughout morning  seclusive to room at times but out of bed for AM group  Pt stated that he feels back to himself  Appears internally preoccupied but denies hallucinations  Denies SI  No tearfulness observed  Scant during interaction  Feels that medicine is helping  Reports sleeping a lot lately and was not sleeping well prior to admission  No questions/concerns at this time

## 2018-03-30 NOTE — CASE MANAGEMENT
CM met w/ PT and MD today and reviewed PHP  At this time, PT is in agreement w/ PHP referral to [facility]  Discussed that CM can submit a referral today; however, may not hear back from them until Monday regarding outcome of whether or not insurance is in network w/ PHP or if PT is accepted and start date  Discussed recommendation of PT stepping down from [facility] to [facility] the following day so depending on the start date of PHP would project PT's D/C from [facility]  PT verbalized understanding this  PT not interested in [facility] as PT's mother isn't supportive of this and PT is a 50 minute transport to [facility]  At this time, PT is hopeful for stepdown to [facility] through [facility]       PT signed SHASHI for the following:     [facility] located @ [address] [place] [id number] (Y) [phone] (V) [phone]    CM completed a PHP referral for PT and faxed the following information over to [facility] located @ [address] [place] [address] (I) [phone] (E) [phone]:     Referral  Face Sheet  Both Insurance Cards  Medication List  Psychiatric Evaluation   History and Physical  Labs

## 2018-03-30 NOTE — PLAN OF CARE
Problem: Alteration in Thoughts and Perception  Goal: Verbalize thoughts and feelings  Interventions:  - Promote a nonjudgmental and trusting relationship with the patient through active listening and therapeutic communication  - Assess patient's level of functioning, behavior and potential for risk  - Engage patient in 1 on 1 interactions for a minimum of 15 minutes each session  - Encourage patient to express fears, feelings, frustrations, and discuss symptoms    - Seffner patient to reality, help patient recognize reality-based thinking   - Administer medications as ordered and assess for potential side effects  - Provide the patient education related to the signs and symptoms of the illness and desired effects of prescribed medications   Outcome: Completed Date Met: 03/30/18  Frequently shares thoughts/feelings with staff

## 2018-03-30 NOTE — PROGRESS NOTES
Pt remains seclusive in room and scant in conversation with writer  Pt states that he has been bored not being able to read his textbooks and asked if his mom could bring a book in for him to pass time  Pt had a good meeting with his mom this evening  Denies AVH and SI this evening  No questions or concerns this evening  Will continue to monitor

## 2018-03-30 NOTE — CASE MANAGEMENT
CM received a voice-mail from [address] @ [address] [facility] [address] located @ [address] [facility] [address] (J) [phone] (M) [phone] who confirmed that both PT's insurances are in-network and PT is covered @ 100%  Per Marcus Ortiz, their first opening is on Tuesday 04/10/18  Marcus Ortiz confirmed putting PT in this spot for now  Marcus Ortiz communicated due to the length of getting into PHP, PT can go through Open Access and participate in TOP (Transitional Outpatient Programm) and PT would need to complete a walk-in for this before starting PHP in order to avoid lapse in services  Marcus Ortiz requested CM call her back regarding this  CM outreached to Marcus Ortiz and reviewed this  Marcus Ortiz confirmed that this is the earliest start date they have and reviewed that if PT discharges from [facility] before PHP starts, PT can go through [facility] and CM would need to call Marcus Ortiz prior to PT's D/C so Marcus Ortiz can add PT on the exception list; as Marcus Ortiz doesn't want PT to be denied for services through [facility] due to [facility] private health insurance plan  CM agreed to review this information w/ PT and MD and follow up w/ Marcus Ortiz closer to PT's D/C date

## 2018-03-30 NOTE — CASE MANAGEMENT
CM outreached to [facility] located @ [address] [facility] [address] (N) [phone] (S) [phone] and left a message providing [address] information and inquired about whether or not they are in network before doing a referral

## 2018-03-30 NOTE — PLAN OF CARE
Problem: Ineffective Coping  Goal: Cooperates with admission process  Interventions:   - Complete admission process   Outcome: Completed Date Met: 03/30/18  Completed

## 2018-03-30 NOTE — PROGRESS NOTES
Progress Note - Behavioral Health   Dedrick Gavin 26 y o  male MRN: [id number]  Unit/Bed#: [facility] 254-01 Encounter: [id number]    Assessment/Plan   Principal Problem:    Schizoaffective disorder, depressive type ([address] )    Subjective: Patient is compliant with medications with no acute side effects  Patient reports improvement in mood and anxiety and is less preoccupied  She is less paranoid and denies psychotic symptoms today  Patient is receptive to treatment recommendation and agreed with plan of step-down to partial outpatient program follow-up next week  He is seen more out in milieu today attending group and participating in milieu therapy  No physical complaints or concerns reported by patient  Current Medications:    Current Facility-Administered Medications:  acetaminophen 650 mg Oral Q6H PRN Para Leep, MD   aluminum-magnesium hydroxide-simethicone 20 mL Oral Q4H PRN Para Leep, MD   benztropine 1 mg Oral Q6H PRN Para Leep, MD   haloperidol 5 mg Oral Q6H PRN Para Leep, MD   haloperidol lactate 5 mg Intramuscular Q6H PRN Para Leep, MD   hydrOXYzine HCL 25 mg Oral Q6H PRN Para Leep, MD   ibuprofen 400 mg Oral Q8H PRN Para Leep, MD   ibuprofen 600 mg Oral Q8H PRN Para Leep, MD   ibuprofen 800 mg Oral Q8H PRN Para Leep, MD   influenza vaccine 0 5 mL Intramuscular Prior to discharge Luis Grider   LORazepam 2 mg Intramuscular Q4H PRN Para Leep, MD   magnesium hydroxide 30 mL Oral Daily PRN Para Leep, MD   nicotine 1 patch Transdermal Daily Para Leep, MD   nicotine polacrilex 4 mg Oral Q2H PRN IMELDA Estrada   OLANZapine 7 5 mg Oral HS [address] [facility] Medications: all current active meds have been reviewed      Vital signs in last 24 hours:  Temp:  [96 4 °F (35 8 °C)-98 3 °F (36 8 °C)] 96 4 °F (35 8 °C)  HR:  [81-84] 84  Resp:  [16] 16  BP: (118129)/(5668) 118/56    Laboratory results:    I have personally reviewed all pertinent laboratory/tests results  Labs in last 72 hours: No results for input(s): WBC, RBC, HGB, HCT, PLT, RDW, NEUTROABS, NA, K, CL, CO2, BUN, CREATININE, GLUCOSE, CALCIUM, AST, ALT, ALKPHOS, PROT, ALBUMIN, BILITOT, CHOL, HDL, TRIG, LDLCALC, VALPROICTOT, CARBAMAZEPIN, LITHIUM, AMMONIA, [id number], FREET4, T3FREE, PREGTESTUR, PREGSERUM, HCG, HCGQUANT, RPR in the last 72 hours      Invalid input(s):  RBC  Admission Labs:   Admission on 03/24/2018   Component Date Value    EXTBreath Alcohol 03/24/2018 neg     Amph/Meth UR 03/24/2018 Negative     Barbiturate Ur 03/24/2018 Negative     Benzodiazepine Urine 03/24/2018 Negative     Cocaine Urine 03/24/2018 Negative     Methadone Urine 03/24/2018 Negative     Opiate Urine 03/24/2018 Negative     PCP Ur 03/24/2018 Negative     THC Urine 03/24/2018 Positive*    WBC 03/24/2018 7 48     RBC 03/24/2018 5 56     Hemoglobin 03/24/2018 16 3     Hematocrit 03/24/2018 46 2     MCV 03/24/2018 83     MCH 03/24/2018 29 3     MCHC 03/24/2018 35 3     RDW 03/24/2018 13 5     MPV 03/24/2018 11 5     Platelets 46/05/8434 240     Neutrophils Relative 03/24/2018 57     Lymphocytes Relative 03/24/2018 32     Monocytes Relative 03/24/2018 10     Eosinophils Relative 03/24/2018 1     Basophils Relative 03/24/2018 0     Neutrophils Absolute 03/24/2018 4 32     Lymphocytes Absolute 03/24/2018 2 36     Monocytes Absolute 03/24/2018 0 73     Eosinophils Absolute 03/24/2018 0 04     Basophils Absolute 03/24/2018 0 03     Sodium 03/24/2018 138     Potassium 03/24/2018 4 5     Chloride 03/24/2018 101     CO2 03/24/2018 25     Anion Gap 03/24/2018 12     BUN 03/24/2018 11     Creatinine 03/24/2018 0 93     Glucose 03/24/2018 145*    Calcium 03/24/2018 8 9     AST 03/24/2018 26     ALT 03/24/2018 26     Alkaline Phosphatase 03/24/2018 98     Total Protein 03/24/2018 8 1     Albumin 03/24/2018 4 3     Total Bilirubin 03/24/2018 1 00     eGFR 03/24/2018 117     Cholesterol 03/26/2018 114     Triglycerides 03/26/2018 67     HDL, Direct 03/26/2018 58     LDL Calculated 03/26/2018 43     TSH 3RD GENERATON 03/26/2018 1 949     Clarity, UA 03/27/2018 Clear     Color, UA 03/27/2018 Yellow     Specific Gravity, UA 03/27/2018 1 025     pH, UA 03/27/2018 6 0     Glucose, UA 03/27/2018 Negative     Ketones, UA 03/27/2018 Negative     Blood, UA 03/27/2018 Negative     Protein, UA 03/27/2018 Negative     Nitrite, UA 03/27/2018 Negative     Bilirubin, UA 03/27/2018 Negative     Urobilinogen, UA 03/27/2018 0 2     Leukocytes, UA 03/27/2018 Negative     WBC, UA 03/27/2018 1-2*    RBC, UA 03/27/2018 1-2*    Bacteria, UA 03/27/2018 None Seen     Epithelial Cells 03/27/2018 Occasional        Psychiatric Review of Systems:  Behavior over the last 24 hours:  improving  Sleep: normal  Appetite: normal  Medication side effects: No  ROS: no complaints    Mental Status Evaluation:  Appearance:  casually dressed   Behavior:  guarded   Speech:  soft   Mood:  anxious   Affect:  constricted   Language naming objects   Thought Process:  circumstantial   Thought Content:  reduced paranoia   Perceptual Disturbances: less preoccupied   Risk Potential: Suicidal Ideations without plan, Homicidal Ideations none and Potential for Aggression No   Sensorium:  person and place   Cognition:  grossly intact   Consciousness:  awake    Attention: attention span appeared shorter than expected for age   Insight:  limited   Judgment: limited   Intellect fair   Gait/Station: normal gait/station   Motor Activity: no abnormal movements     Progress Toward Goals: slow progress    Recommended Treatment:   Step down to partial outpatient follow-up on discharge  Continue with group therapy, milieu therapy and occupational therapy      Continue following current medications:   Current Facility-Administered Medications:  acetaminophen 650 mg Oral Q6H PRN Nancy Desai MD   aluminum-magnesium hydroxide-simethicone 20 mL Oral Q4H PRN Nancy Desai MD   benztropine 1 mg Oral Q6H PRN Nancy Desai MD   haloperidol 5 mg Oral Q6H PRN Nancy Desai MD   haloperidol lactate 5 mg Intramuscular Q6H PRN Nancy Desai MD   hydrOXYzine HCL 25 mg Oral Q6H PRN Nancy Desai MD   ibuprofen 400 mg Oral Q8H PRN Nancy Desai MD   ibuprofen 600 mg Oral Q8H PRN Nancy Desai MD   ibuprofen 800 mg Oral Q8H PRN Nancy Desai MD   influenza vaccine 0 5 mL Intramuscular Prior to discharge Luis Grider   LORazepam 2 mg Intramuscular Q4H PRN Nancy Desai MD   magnesium hydroxide 30 mL Oral Daily PRN Nancy Desai MD   nicotine 1 patch Transdermal Daily Nancy Desai MD   nicotine polacrilex 4 mg Oral Q2H PRN IMELDA Tomlinson   OLANZapine 7 5 mg Oral HS Luis Grider       Risks, benefits and possible side effects of Medications:   Risks, benefits, and possible side effects of medications explained to patient and patient verbalizes understanding  This note has been constructed using a voice recognition system  There may be translation, syntax,  or grammatical errors  If you have any questions, please contact the dictating provider

## 2018-03-31 PROCEDURE — 99231 SBSQ HOSP IP/OBS SF/LOW 25: CPT | Performed by: PSYCHIATRY & NEUROLOGY

## 2018-03-31 RX ADMIN — OLANZAPINE 7.5 MG: 5 TABLET, ORALLY DISINTEGRATING ORAL at 21:22

## 2018-03-31 NOTE — PROGRESS NOTES
Spoke with pt's mother Christian Melvin this afternoon who reported that visit with pt last evening was not good  Pt was depressed and focused on going home  Pt visited with his brother and brother's significant other this afternoon  Pt's brother reported that their visit did not go well  He reported that pt was more paranoid than he has been the past several days  Pt spoke about going to assisted tomorrow  Pt's brother reported that pt has believed for awhile now that he will go to assisted April 1st but is unsure why  He also reported that pt said he has 2 choices when he leaves here, and then said "never mind" when brother asked him what he meant  Pt's brother said that pt spoke about wanting to leave the hospital and he believes that pt would not continue taking medication if discharged from the hospital    Spoke with [facility] after conversation with his brother  [facility] very focused on empathy during conversation  Repeated multiple times that he has no empathy and feels this is from being here and from taking medication  He said "I feel like I have a shell around my heart " Pt said "I don't know" when questioned what makes him think he would be going to assisted tomorrow  Pt said that he has paranoia "from doing  " Pt focused on leaving the hospital and feels that medication and hospitalization are causing him to lack any emotion  When questioned why pt feels he has 2 options when he leaves here, pt began talking about doing outpatient treatment and working a low level job  Pt denies SI  Denies depression  Denies hallucinations  Pt continues to say that he feels like himself again and lacks insight into need for treatment

## 2018-03-31 NOTE — PLAN OF CARE
Problem: Risk for Self Injury/Neglect  Goal: Recognize maladaptive responses and adopt new coping mechanisms  Outcome: Completed Date Met: 03/31/18      Problem: Depression  Goal: Complete daily ADLs, including personal hygiene independently, as able  Interventions:  - Observe, teach, and assist patient with ADLS  -  Monitor and promote a balance of rest/activity, with adequate nutrition and elimination    Outcome: Completed Date Met: 03/31/18

## 2018-03-31 NOTE — PROGRESS NOTES
Pt's mother, Angela Duron, called and spoke at length with this nurse talking about the entire lifetime history of her family and Carlos Fabian  Says pt is very focused on his ex-girlfriend and best friend living together in [place]  Angela Duron reports plans to visit tonight at 6pm   Pt says he is okay with having her visit him tonight  Pt remaining seclusive to self/room  Says he has no concerns at this time

## 2018-03-31 NOTE — PROGRESS NOTES
Progress Note - Behavioral Health     Caro Castellanos 26 y o  male MRN: @MRN   Unit/Bed#: [facility] 256-01 Encounter: [id number]    Patient is much improved recently    Caro Castellanos reports today that he is feeling better  He reports depression is a 1-2 and anxiety is a 1/10  No suicidal or homicidal ideation auditory visual hallucinations or paranoia  He does say that he is protective but otherwise denies any suggestions of symptoms of psychosis presently  No side effects or issues with medications  Energy good    Sleep: normal  Appetite: normal  Medication side effects: No   ROS: no complaints    Mental Status Evaluation:    Appearance:  age appropriate   Behavior:  pleasant, cooperative, with good eye contact, guarded   Speech:  Normal volume, regular rate and rhythm   Mood:  euthymic   Affect:  restricted       Thought Process:  Linear and goal directed   Associations: intact associations   Thought Content:  normal and appropriate, "Protective" personality (does not trust people much but denies paranoia)   Perceptual Disturbances: no auditory or visual hallcunations   Risk Potential: Suicidal ideation - None  Homicidal ideation - None  Potential for aggression - No   Sensorium:  A&Ox3   Cognition:  grossly intact   Consciousness:  Alert & Awake   Memory:  recent and remote memory grossly intact   Attention: attention span and concentration are age appropriate   Intellect: within normal limits       Insight:  fair   Judgment: fair         Gait/Station: normal gait/station with good balance   Motor Activity: no abnormal movements     Vital signs in last 24 hours:    Temp:  [96 4 °F (35 8 °C)-97 7 °F (36 5 °C)] 96 4 °F (35 8 °C)  HR:  [96-98] 96  Resp:  [18] 18  BP: (109-137)/(60-70) 109/60    Laboratory results:  I have personally reviewed all pertinent laboratory/tests results      Progress Toward Goals:  slow improvement  Assessment/Plan   Principal Problem:    Schizoaffective disorder, depressive type [facility])      Justin Shea is doing well with meds, appearing to improve  Denies psychotic symptoms, but some baseline 'protective' thinking socially  Recommended Treatment:     Planned medication and treatment changes: All current active medications have been reviewed  Current Facility-Administered Medications:  acetaminophen 650 mg Oral Q6H PRN Fernanda Fitzpatrick MD   aluminum-magnesium hydroxide-simethicone 20 mL Oral Q4H PRN Fernanda Fitzpatrick MD   benztropine 1 mg Oral Q6H PRN Fernanda Fitzpatrick MD   haloperidol 5 mg Oral Q6H PRN Fernanda Fitzpatrick MD   haloperidol lactate 5 mg Intramuscular Q6H PRN Fernanda Fitzpatrick MD   hydrOXYzine HCL 25 mg Oral Q6H PRN Fernanda Fitzpatrick MD   ibuprofen 400 mg Oral Q8H PRN Fernanda Fitzpatrick MD   ibuprofen 600 mg Oral Q8H PRN Fernanda Fitzpatrick MD   ibuprofen 800 mg Oral Q8H PRN Fernanda Fitzpatrick MD   influenza vaccine 0 5 mL Intramuscular Prior to discharge Luis Grider   LORazepam 2 mg Intramuscular Q4H PRN Fernanda Fitzpatrick MD   magnesium hydroxide 30 mL Oral Daily PRN Fernanda Fitzpatrick MD   nicotine 1 patch Transdermal Daily Fernanda Fitzpatrick MD   nicotine polacrilex 4 mg Oral Q2H PRN IMELDA Romero   OLANZapine 7 5 mg Oral HS Cheikh Haile       1) continue current treatment  2) Continue to support patient and engage them in the programs available as feasible and appropriate  Continue case management support and therapy  Continue discharge planning  Risks / Benefits of Treatment:    Risks, benefits, and possible side effects of medications explained to patient and patient verbalizes understanding and agreement for treatment  Counseling / Coordination of Care:    Patient's progress reviewed with nursing staff  Medications, treatment progress and treatment plan reviewed with patient        Nayely Pimentelanant MCKEON DO  3/31/2018  7:51 AM

## 2018-03-31 NOTE — PROGRESS NOTES
Pt calm and seclusive to self  Soft spoken  Denies all sx, however, appears slightly distracted  Showered this morning  Reports having slept well last night  Reports good appetite  Offers no questions or concerns at this time

## 2018-04-01 PROCEDURE — 99232 SBSQ HOSP IP/OBS MODERATE 35: CPT | Performed by: PSYCHIATRY & NEUROLOGY

## 2018-04-01 RX ORDER — OLANZAPINE 10 MG/1
10 TABLET, ORALLY DISINTEGRATING ORAL
Status: DISCONTINUED | OUTPATIENT
Start: 2018-04-01 | End: 2018-04-04 | Stop reason: HOSPADM

## 2018-04-01 RX ADMIN — NICOTINE 1 PATCH: 21 PATCH, EXTENDED RELEASE TRANSDERMAL at 09:29

## 2018-04-01 RX ADMIN — OLANZAPINE 10 MG: 10 TABLET, ORALLY DISINTEGRATING ORAL at 21:50

## 2018-04-01 NOTE — PROGRESS NOTES
Progress Note - Behavioral Health     Rodrigo Cope 26 y o  male MRN: @MRN   Unit/Bed#: Rajni Reddy 256-01 Encounter: [id number]    Patient was same bizarre things to his brother and still reported to be paranoid  He mentioned that he is thinking is going to long term  Could not say why  Family felt that yesterday he was crying and not doing as good as they would hope  Rodrigo Cope reports today that he is doing fine and minimize his symptoms  We spent a good 30 minutes talking to each other today  He admitted that he is struggling with things but would still not open up to the paranoia that he has been feeling  We talked about his medications, his diagnosis that he wants to talk about further with Dr carrasco about, and overall well-being  He denied depression presently but admits anxiety and overwhelm  He is interested and agreeable to the increase of Zyprexa that I mentioned although he notes that he does not want to be numb by his medication  Medication makes him sleepy at night  Energy is okay today  No other concerns or questions at this time  Patient is looking forward to discharge  Discussed his job aspirations, life hopes, frustration with situation  Visit with family made him feel better, thinking about his life makes it worse      Sleep: normal  Appetite: normal  Medication side effects: No   ROS: no complaints    Mental Status Evaluation:    Appearance:  age appropriate   Behavior:  pleasant, cooperative, with good eye contact   Speech:  Normal volume, regular rate and rhythm   Mood:  dysphoric, anxious   Affect:  depressed       Thought Process:  Linear and goal directed   Associations: intact associations   Thought Content:  paranoid ideation   Perceptual Disturbances: no auditory or visual hallcunations   Risk Potential: Suicidal ideation - None  Homicidal ideation - None  Potential for aggression - No   Sensorium:  A&Ox3   Cognition:  grossly intact   Consciousness:  Alert & Awake   Memory:  recent and remote memory grossly intact   Attention: attention span and concentration are age appropriate   Intellect: within normal limits       Insight:  limited   Judgment: fair         Gait/Station: normal gait/station with good balance   Motor Activity: no abnormal movements     Vital signs in last 24 hours:    Temp:  [97 1 °F (36 2 °C)-97 2 °F (36 2 °C)] 97 2 °F (36 2 °C)  HR:  [] 100  Resp:  [16] 16  BP: (108-141)/(54-72) 141/72    Laboratory results:  I have personally reviewed all pertinent laboratory/tests results  Progress Toward Goals:  slow improvement  Assessment/Plan   Principal Problem:    Schizoaffective disorder, depressive type (HCC)      Noé Thapa is still paranoid, making peculiar statements, guarded  I think increase in zyprexa needed  Recommended Treatment:     Planned medication and treatment changes: All current active medications have been reviewed        Current Facility-Administered Medications:  acetaminophen 650 mg Oral Q6H PRN Vanessa Whelan, MD   aluminum-magnesium hydroxide-simethicone 20 mL Oral Q4H PRN Vanessa Whelan, MD   benztropine 1 mg Oral Q6H PRN Vanessa Whelan, MD   haloperidol 5 mg Oral Q6H PRN Vanessa Whelan, MD   haloperidol lactate 5 mg Intramuscular Q6H PRN Vanessa Whelan, MD   hydrOXYzine HCL 25 mg Oral Q6H PRN Vanessa Whelan, MD   ibuprofen 400 mg Oral Q8H PRN Vanessa Whelan, MD   ibuprofen 600 mg Oral Q8H PRN Vanessa Whelan, MD   ibuprofen 800 mg Oral Q8H PRN Vanessa Whelan, MD   influenza vaccine 0 5 mL Intramuscular Prior to discharge Luis Grider   LORazepam 2 mg Intramuscular Q4H PRN Vanessa Whelan MD   magnesium hydroxide 30 mL Oral Daily PRN Vanessa Whelan MD   nicotine 1 patch Transdermal Daily Vanessa Whelan MD   nicotine polacrilex 4 mg Oral Q2H PRN IMELDA Kimbrough   OLANZapine 10 mg Oral HS Alvaro Ortiz III, DO       1) Increase Zyprexa to 10mg HS  2) Patient 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), and does not want to feel 'numb'  Was guarded, but did open up with collaboration/partnering today  Family visits suggested he is still struggling, although he denies in session for the most part  3) Continue to support patient and engage them in the programs available as feasible and appropriate  Continue case management support and therapy  Continue discharge planning  4) Patient hopes to discuss his diagnosis on Monday    Risks / Benefits of Treatment:    Risks, benefits, and possible side effects of medications explained to patient and patient verbalizes understanding and agreement for treatment  Counseling / Coordination of Care:    Patient's progress reviewed with nursing staff  Medications, treatment progress and treatment plan reviewed with patient        Param Terrazas III, DO  4/1/2018  5:44 PM

## 2018-04-01 NOTE — PROGRESS NOTES
Pt remains scant during interaction  C/o fatigue this morning and said he would just like to rest  Feels that medication is causing drowsiness  Visible in milieu throughout shift and social with select peers  Pt denies SI  Denies all symptoms at this time  Did not discuss any delusional thoughts with writer

## 2018-04-01 NOTE — PROGRESS NOTES
Pt pleasant and visible in milieu  Pt states that he is feeling tired today and that he still remains paranoid  Denies SI/HI/AVH  PT states that he feels like he is in half-way and this is giving him anxiety and stressing him out  Pt states that he hopes he will be discharged early in the week, and believes that once he is home he will do better with his medications and therapy  Pt minimizes all symptoms and feelings to writer  Pts mother Colt Davila called this evening and talked with writer  Pts mother states that she is concerned with the progress pt is making  Bernadette Locke stated that her son Jama Garrett had a visit with the pt this evening and was worried about him after he stated that he had 3 options in life: suicide, detention, or staying in a hospital forever  Bernadette Locke believes that the pt is getting worse since being hospitalized and told this writer than her son has a history of hiding/cheeking medications  Bernadette Locke is requesting a meeting on Tuesday with VISHAL and   To discuss her sons progression

## 2018-04-02 PROBLEM — F33.3 SEVERE EPISODE OF RECURRENT MAJOR DEPRESSIVE DISORDER, WITH PSYCHOTIC FEATURES (HCC): Status: ACTIVE | Noted: 2018-03-25

## 2018-04-02 PROCEDURE — 99232 SBSQ HOSP IP/OBS MODERATE 35: CPT | Performed by: PSYCHIATRY & NEUROLOGY

## 2018-04-02 RX ADMIN — OLANZAPINE 10 MG: 10 TABLET, ORALLY DISINTEGRATING ORAL at 21:11

## 2018-04-02 RX ADMIN — NICOTINE POLACRILEX 4 MG: 4 GUM, CHEWING ORAL at 21:11

## 2018-04-02 NOTE — CASE MANAGEMENT
CM outreached to PT's PCP- [facility] located @ [address] [place] [address] (k) [phone] (f) [phone] and spoke w/ the  who transferred CM to to the nurse's line  CM left a voice-mail regarding PT's admission and projected D/C  CM agreed to forward PT's D/C information upon D/C

## 2018-04-02 NOTE — CASE MANAGEMENT
CM outreached to [facility] located @ [address] [place] [address] (P) [phone] (I) [phone] and left a voice-mail for the Director [place] clarifying PT's projected D/C on Wednesday per MD  Confirmed PT's start date for [facility] Tuesday 04/10/18 w/ plan for TOP until PHP   CM requested a call back to clarify PHP hours as well

## 2018-04-02 NOTE — CASE MANAGEMENT
VISHAL received a voice-mail from PT's mother Lurdes Vigil (B) [phone] communicating that the psychiatrist calling her for tomorrow is not acceptable and that she wants an in person meeting tomorrow w/ VISHAL, PT's RN, and the psychiatrist  VISHAL notified MD of this  [facility] Manager aware

## 2018-04-02 NOTE — CASE MANAGEMENT
CM received a voice-mail from PT's mother Tomas Luke (F) [phone] communicating that she is expecting a call back and is demanding a family meeting tomorrow on Tuesday as Tomas Luke alleges that she has been requesting to speak w/ the physician to no avail  CM outreached to PT's Mother- Yaakov Jarvis located @ [address] [place] [address] (P) [phone] who communicated that she wants a family meeting tomorrow w/ the expectation that the physician will be there  Tomas Luke communicated that she is frustrated w/ CM as she has been asking CM to speak w/ the MD and no one has got back to her  CM apologized and communicated throughout all of CM's communications w/ Liannedagmar Davila never indicated or requested to speak w/ the MD  Laishayulia Luke stated, "Yes I did, I know I did because I wrote it in my notes " CM reviewed the typical process of notifying the MD when this occurs  After several minutes of Tomas Luke talking Tomas Luke indicated that she remembers now that she spoke w/ a nurse and requested this  Laishachandanacameron Ti also reported being upset w/ CM that CM did not call her back or respond to her e-mails  CM reviewed that CM does not work Friday after 4:00pm or over the weekend  CM reviewed that Tomas Luke was previously educated on contacting the nurses's station during off hours, in which Tomas Luke did as she had spoken w/ staff over the weekend  Tomas Luke stated, "It's been 10 days and I haven't spoken with a doctor   I don't know know what my son's diagnosis is, he doesn't know what it is, I don't know what's going on up there " CM reviewed that CM's first phone call w/ Tiffanie Elmore reviewed PT's diagnosis and provided psychoeducation surrounding this and provided resources for further education on PT's diagnosis including Sergio Mcgill stated,  "I want to hear the words from him (referencing psychiatrist) not you " CM agreed to notified psychiatrist of Natasha's request  CM also reviewed that PT is educated on his diagnosis and symptom presentation on a daily basis when meeting w/ MD and nursing staff provides further education daily on this as well  Allysonana luisa Ny communicated being extremely concerned that she visited PT on Friday and PT was tearful and crying throughout the session  Elvismagdalena Ny reports that PT's brother and PT's brother's girlfriend went to visit w/ him on Saturday; and PT presented as rambling and not doing well, etc  Gwendolyn Alejandra communicated that yesterday PT communicated beliefs that he is going to be arrested  Gwendolyn Alejandra stated, "I don't know what happened from Friday until now but he's not well " Elvismagdalena Ny reported that PT's Aunt and Uncle visited PT and PT asked that they leave due to PT feeling lethargic on his medications  CM reviewed that on Friday CM and MD met w/ PT today and PT presented as well and agreed to step down to [facility]  CM confirmed that a referral was submitted for PT and that PT was accepted and CM confirmed that both of PT's insurances are in-network w/ PHP  Discussed that PT was accepted for PHP starting Tuesday 04/10/18 and reviewed that on Friday, PT was projected for D/C on Tuesday 04/03/18  Reviewed that based upon how PT did over the weekend, it was discussed in rounds that psychiatrist will meet w/ PT today to further assess to determine D/C  Discussed that PT can do a transitional outpatient program (TOP) through [facility] until PT is able to get into PHP  Discussed that CM will need to review w/ CM regarding projected timeframe for D/C  Gwendolyn Alejandra communicated that she works today from 11:00am-9:00pm and isn't available until 4:30pm for dinner to speak w/ CM  CM reviewed that CM works until 4:00pm and after that Gwendolyn Aleajndra will need to call the nurse's station for an update   Gwendolyn Alejandra communicated that she plans on coming tomorrow for a meeting w/ the physician and she plans to bring, "Michael's advocate," who Gwendolyn Alejandra described as Luis Brandon a licensed registered therapist in a private practice that is not PT's therapist to the meeting  Maddie Martinez communicated that she works in a [address] [place] [address] office and reports she cannot understand why no one is communicating w/ her  CM reviewed that this is CM's third time speaking w/ Manpreetpetar Martinez throughout PT's hospitalization and reviewed that Maddie Martinez has also spoken w/ nursing staff as well  Maddie Martinez communicated that she plans to have to doctors that she works with outreach to [facility], "To find out what goes on up there "     CM reviewed that [address] is committed to providing effective clinical services to PT and support Maddie Martinez and her family throughout this process  CM updated MD on this who communicated that he will attempt to call Maddie Martinez today/ tomorrow if schedule permits and is not planning on having a meeting tomorrow  MD communicated that he evaluated PT today and plans to D/C on Wednesday pending progression in stabilization and sedation  VISHAL also updated U Manager

## 2018-04-02 NOTE — DISCHARGE INSTR - OTHER ORDERS
You were provided with the following resources, services, and supports throughout [facility]:               [facility] Department of    Mental Health/Developmental Programs Austin Winters Dara [address]                     [phone]  TDD Line                                           5-350-369-0748    ADVOCACY/PEER Viviana                               [phone]  Consumer/Family Satisfaction Team    The [address]    [phone]    Disability Rights [place]                             [phone]                                                    [TDD] [phone]                                [address] [facility]  Cristi Watkins [address]  [phone]    The following agencies may provide services to people with no insurance and those with Medical Assistance and Insurance:  [facility] 80         [address] [facility]               [phone]  The [facility]               [phone]  Family Service Association [facility]   [address]  [facility]           [phone]      D&A SELF-HELP SUPPORT GROUPS    [place]  Families of people with addiction       4-420.498.7859 or 589-521-7161  Alcoholics Anonymous                                           9-734.209.4455 or 593-235-0507  Cocaine Anonymous                                   1-255.443.9294  Codependence Anonymous 981.125.2500  Double Trouble Groups [facility]                   [phone]  Narcotics Anonymous                                 [phone] [address] [facility] Dept  of Mental Health/Developmental Programs                                                        [phone]  Information and referrals for case management, residential, and employment services  The following agencies provide services to people with no Insurance and those with [place]:  Babita                             [phone]  [facility] Human Services                                    [phone]  [facility] Case Management)  [facility] SITE                       [address]                                     [phone]    The following agencies provide services and supports to people with Medical Assistance and Insurance:  Family Service Association [facility]* [address] [facility] SOLDIERS & SAILORS [facility]*                                          [phone]  *Polish speaking staff available      Joy Baeza [address]                          125-680-6123  Wilfred Terry                                   [phone]  press 1  Department of [employer]  [phone] [address] [place] [address] 24-hour Crisis Intervention Services 2-624.431.2486              [address] [place] [address] Blvd:                [address] [facility] Blvd:                  [phone]                 - Ethyl Bombard Line:                              [address]:                  [phone]    ACCESS-Childrens Crisis Support       4-766.932.5605  [facility] Jose Angel Suicide Prevention Lifeline      1-800 [address] [facility] after Hours Emergency #            3-103.507.8249  press 21  Childline      6-570.967.7045  [address]                                        1-483.956.8035  (Network of Victim Assistance)  [facility] Hotline                      5-811.768.7542  24hr  Domestic [place] on Aging Donald Minor Abuse #) 4-622.123.3967    SELF-HELP/FAMILY [address]                             [phone]  Peer support, education & socialization in [address]  [phone]  [facility]  Peer Support services that are designed to promote recovery and resiliency  The [place] of [facility] [address]     8-210.547.7385  Free Family Education Programs to help family members of individuals with an active addiction  [employer]  Ctr      893-652-4105  [address] [place] [address]  Ctr   [phone]    [facility] [place] Program    [phone]  ROLAND Moscosoj [address] [place] [address] Association           7-943.841.1626  MELISSA of [facility]                          [phone]  ([facility] on [address] [facility])  Provides individual & family support, education & advocacy  CONTACT Helpline (a Warm Line)          [facility]               [address]             [address]               [address] (BIANCA) [address]                    www biancabucks  [address] [place] [address], pantry, utility aid & self-sufficiency programs  - [address]  [place]                             911.220.4686  - [facility]                            [phone]    Department of Public Welfare                  [phone]  Programs include [employer], [address] insurance    Social Security Administration              0-925.684.3314    [facility] INFORMATION    [facility] Transport                        [phone]  Applications and appointment scheduling for [employer], Persons with Disabilities Program, Emanuel Peterson, and [employer] & [place] Lines                  7-490.730.4206  [facility] FOR WOMEN & BABIES [facility]                           [phone]  SEPTA TDD Line                                     [address] [facility]                     [phone]  For individuals who are homeless or experiencing a housing-related crisis  [facility] [address]                                 676.717.5322 - [address] [place] [address]  Public housing for people with disabilities  [address] Association (D&A):  [place] at  [facility] Management  Projects for Assistance in Transition from Homelessness ([facility])                                      353.169.9425 [address]                     [phone]                           [facility]                     [address] [facility]               [phone]  ESQQSAC WASBL            [address] [facility] [address]                [address] [facility]     of [facility]                                    [phone]                 Pool [place]                                [address] Jaswant Savage               [phone]  Protection from [address]

## 2018-04-02 NOTE — CASE MANAGEMENT
CM outreached to [facility] located @ [address] [place] [address] (X) [phone] (N) [phone] and requested to speak w/ the / ICM department   VISHAL left a voice-mail for [address] [facility], Director of Blended Case Management Services requesting a call back to confirm that she received PT's referral

## 2018-04-02 NOTE — CASE MANAGEMENT
CM outreached to PT's mother Patricia Leiva (D) [phone] and left a message confirming that psychiatrist Dr Opal Myles plans to call Patricia Leiva over the telephone tomorrow to speak w/ her

## 2018-04-02 NOTE — CASE MANAGEMENT
VISHAL received a telephone call from [facility] located @ [address] [facility] [address] (M) [phone] (J) [phone] confirming that she will give [employer] a heads up that PT will be coming through for TOP services until PT is able to start PHP  VISHAL also confirmed that CM submitted a RC/ Case Management referral through [facility] today

## 2018-04-02 NOTE — PROGRESS NOTES
Progress Note - Behavioral Health   Donald Stable 26 y o  male MRN: [id number]  Unit/Bed#: [facility] 256-01 Encounter: [id number]    Assessment/Plan   Principal Problem:    Severe episode of recurrent major depressive disorder, with psychotic features ([address] )      Subjective:  Patient is compliant with medication and with mild sedation noted in the morning time  Discussed how he had similar sedation when olanzapine was initiated but sedation improved quickly  Patient reports understanding  Patient was more verbal today and discussed the differential diagnosis of major depression with psychotic features versus schizoaffective disorder depressive type  Patient reports he meets criteria for major depression with psychotic features  He understood the importance of keeping schizoaffective is rule out  Agreed to follow up with outpatient providers on discharge  Patient worries that these diagnosis will affect his future jobs and [employer]  His level of anxiety was appropriate  I also discussed family's concerns including patient verbalizing suicide during their visit  Patient denies these as concerns and denies endorsing suicidal or homicidal ideations today  Patient did got anxious after family's concerns were discussed in detail  He did remained appropriate during entire evaluation and agrees to continue olanzapine and step-down to partial outpatient follow-up on discharge      Current Medications:    Current Facility-Administered Medications:  acetaminophen 650 mg Oral Q6H PRN Clare Dougherty MD   aluminum-magnesium hydroxide-simethicone 20 mL Oral Q4H PRN Clare Dougherty MD   benztropine 1 mg Oral Q6H PRN Clare Dougherty MD   haloperidol 5 mg Oral Q6H PRN Clare Dougherty MD   haloperidol lactate 5 mg Intramuscular Q6H PRN Clare Dougherty MD   hydrOXYzine HCL 25 mg Oral Q6H PRN Clare Dougherty MD   ibuprofen 400 mg Oral Q8H PRN Clare Dougherty MD   ibuprofen 600 mg Oral Q8H PRN Jimena Torres MD   ibuprofen 800 mg Oral Q8H PRN Jimena Torres MD   influenza vaccine 0 5 mL Intramuscular Prior to discharge Luis Grider   LORazepam 2 mg Intramuscular Q4H PRN Jimena Torres MD   magnesium hydroxide 30 mL Oral Daily PRN Jimena Torres MD   nicotine 1 patch Transdermal Daily Jimena Torres MD   nicotine polacrilex 4 mg Oral Q2H PRN IMELDA Almonte   OLANZapine 10 mg Oral HS Sybil Friday III, DO       Behavioral Health Medications: all current active meds have been reviewed  Vital signs in last 24 hours:  Temp:  [97 1 °F (36 2 °C)-97 2 °F (36 2 °C)] 97 1 °F (36 2 °C)  HR:  [] 95  Resp:  [16] 16  BP: (113-141)/(60-72) 113/60    Laboratory results:    I have personally reviewed all pertinent laboratory/tests results  Labs in last 72 hours: No results for input(s): WBC, RBC, HGB, HCT, PLT, RDW, NEUTROABS, NA, K, CL, CO2, BUN, CREATININE, GLUCOSE, CALCIUM, AST, ALT, ALKPHOS, PROT, ALBUMIN, BILITOT, CHOL, HDL, TRIG, LDLCALC, VALPROICTOT, CARBAMAZEPIN, LITHIUM, AMMONIA, [id number], FREET4, T3FREE, PREGTESTUR, PREGSERUM, HCG, HCGQUANT, RPR in the last 72 hours      Invalid input(s):  RBC  Admission Labs:   Admission on 03/24/2018   Component Date Value    EXTBreath Alcohol 03/24/2018 neg     Amph/Meth UR 03/24/2018 Negative     Barbiturate Ur 03/24/2018 Negative     Benzodiazepine Urine 03/24/2018 Negative     Cocaine Urine 03/24/2018 Negative     Methadone Urine 03/24/2018 Negative     Opiate Urine 03/24/2018 Negative     PCP Ur 03/24/2018 Negative     THC Urine 03/24/2018 Positive*    WBC 03/24/2018 7 48     RBC 03/24/2018 5 56     Hemoglobin 03/24/2018 16 3     Hematocrit 03/24/2018 46 2     MCV 03/24/2018 83     MCH 03/24/2018 29 3     MCHC 03/24/2018 35 3     RDW 03/24/2018 13 5     MPV 03/24/2018 11 5     Platelets 85/56/8844 240     Neutrophils Relative 03/24/2018 57     Lymphocytes Relative 03/24/2018 32     Monocytes Relative 03/24/2018 10     Eosinophils Relative 03/24/2018 1     Basophils Relative 03/24/2018 0     Neutrophils Absolute 03/24/2018 4 32     Lymphocytes Absolute 03/24/2018 2 36     Monocytes Absolute 03/24/2018 0 73     Eosinophils Absolute 03/24/2018 0 04     Basophils Absolute 03/24/2018 0 03     Sodium 03/24/2018 138     Potassium 03/24/2018 4 5     Chloride 03/24/2018 101     CO2 03/24/2018 25     Anion Gap 03/24/2018 12     BUN 03/24/2018 11     Creatinine 03/24/2018 0 93     Glucose 03/24/2018 145*    Calcium 03/24/2018 8 9     AST 03/24/2018 26     ALT 03/24/2018 26     Alkaline Phosphatase 03/24/2018 98     Total Protein 03/24/2018 8 1     Albumin 03/24/2018 4 3     Total Bilirubin 03/24/2018 1 00     eGFR 03/24/2018 117     Cholesterol 03/26/2018 114     Triglycerides 03/26/2018 67     HDL, Direct 03/26/2018 58     LDL Calculated 03/26/2018 43     TSH 3RD GENERATON 03/26/2018 1 949     Clarity, UA 03/27/2018 Clear     Color, UA 03/27/2018 Yellow     Specific Gravity, UA 03/27/2018 1 025     pH, UA 03/27/2018 6 0     Glucose, UA 03/27/2018 Negative     Ketones, UA 03/27/2018 Negative     Blood, UA 03/27/2018 Negative     Protein, UA 03/27/2018 Negative     Nitrite, UA 03/27/2018 Negative     Bilirubin, UA 03/27/2018 Negative     Urobilinogen, UA 03/27/2018 0 2     Leukocytes, UA 03/27/2018 Negative     WBC, UA 03/27/2018 1-2*    RBC, UA 03/27/2018 1-2*    Bacteria, UA 03/27/2018 None Seen     Epithelial Cells 03/27/2018 Occasional        Psychiatric Review of Systems:  Behavior over the last 24 hours:  improving  Sleep: normal  Appetite: normal  Medication side effects: mild sedation  ROS: sedation    Mental Status Evaluation:  Appearance:  casually dressed   Behavior:  guarded   Speech:  soft   Mood:  anxious and depressed   Affect:  constricted   Language naming objects   Thought Process:  circumstantial   Thought Content:  delusions  persecutory Perceptual Disturbances: None   Risk Potential: Suicidal Ideations without plan, Homicidal Ideations none and Potential for Aggression No   Sensorium:  person and place   Cognition:  grossly intact   Consciousness:  awake    Attention: attention span appeared shorter than expected for age   Insight:  limited   Judgment: limited   Intellect fair   Gait/Station: normal gait/station   Motor Activity: no abnormal movements     Progress Toward Goals: slow progress    Recommended Treatment:   Continue with group therapy, milieu therapy and occupational therapy  Continue following current medications:   Current Facility-Administered Medications:  acetaminophen 650 mg Oral Q6H PRN Cecy Heart MD   aluminum-magnesium hydroxide-simethicone 20 mL Oral Q4H PRN Cecy Heart MD   benztropine 1 mg Oral Q6H PRN Cecy Heart MD   haloperidol 5 mg Oral Q6H PRN Cecy Heart MD   haloperidol lactate 5 mg Intramuscular Q6H PRN Cecy Heart MD   hydrOXYzine HCL 25 mg Oral Q6H PRN Cecy Heart MD   ibuprofen 400 mg Oral Q8H PRN Cecy Heart MD   ibuprofen 600 mg Oral Q8H PRN Cecy Heart MD   ibuprofen 800 mg Oral Q8H PRN Cecy Heart MD   influenza vaccine 0 5 mL Intramuscular Prior to discharge Luis Grider   LORazepam 2 mg Intramuscular Q4H PRN Cecy Heart MD   magnesium hydroxide 30 mL Oral Daily PRN Cecy Heart MD   nicotine 1 patch Transdermal Daily Cecy Heart MD   nicotine polacrilex 4 mg Oral Q2H PRN IMELDA Gr   OLANZapine 10 mg Oral HS Jasiel Hoof III, DO       Risks, benefits and possible side effects of Medications:   Risks, benefits, and possible side effects of medications explained to patient and patient verbalizes understanding  This note has been constructed using a voice recognition system  There may be translation, syntax,  or grammatical errors   If you have any questions, please contact the dictating provider

## 2018-04-02 NOTE — PLAN OF CARE
Problem: Alteration in Thoughts and Perception  Goal: Treatment Goal: Gain control of psychotic behaviors/thinking, reduce/eliminate presenting symptoms and demonstrate improved reality functioning upon discharge  Outcome: Not Progressing    Goal: Refrain from acting on delusional thinking/internal stimuli  Interventions:  - Monitor patient closely, per order   - Utilize least restrictive measures   - Set reasonable limits, give positive feedback for acceptable   - Administer medications as ordered and monitor of potential side effects   Outcome: Progressing    Goal: Agree to be compliant with medication regime, as prescribed and report medication side effects  Interventions:  - Offer appropriate PRN medication and supervise ingestion; conduct aims, as needed    Outcome: Progressing    Goal: Attend and participate in unit activities, including therapeutic, recreational, and educational groups  Interventions:  - Provide therapeutic and educational activities daily, encourage attendance and participation, and document same in the medical record    Outcome: Not Progressing    Goal: Recognize dysfunctional thoughts, communicate reality-based thoughts at the time of discharge  Interventions:  - Provide medication and psycho-education to assist patient in compliance and developing insight into his/her illness    Outcome: Progressing    Goal: Complete daily ADLs, including personal hygiene independently, as able  Interventions:  - Observe, teach, and assist patient with ADLS  - Monitor and promote a balance of rest/activity, with adequate nutrition and elimination    Outcome: Progressing

## 2018-04-02 NOTE — PROGRESS NOTES
Medication note: Thorough mouth check done after HS zyprexa given, based on Mother's statement that pt has a history of cheeking med's and her concern about his lack of progress  It did appear pt took his medication  Pt noted to be making bizarre hand gestures in hallway and laughing to himself in dayroom  Staff sat with pt after med admin, to further monitor

## 2018-04-02 NOTE — PROGRESS NOTES
Pt states he is exhausted, although he is sleeping better since he's been here  He rates anxiety and depression as a "2 " He shared that once he leaves here he will no longer be depressed, he just wants to "go home and try to figure things out " In response to [facility] he states, that he gets "weird signals," which are in the back of his mind, telling him he "wants to get home " He hesitated when asked if he was having AH, and denied having them  He is visible on the unit, requires much encouragement to attend groups  Will continue to monitor, provide support

## 2018-04-02 NOTE — PROGRESS NOTES
Pt continues with depressed mood  Denies S/H/I or AVH  Shared that he continues to feel exhausted and feels he needs more sleep, although he states he is sleeping well  Pt is compliant with medications and attends groups  Will continue to monitor, provide support and encouragement

## 2018-04-02 NOTE — CASE MANAGEMENT
CM completed a  referral for PT through [facility] located @ [address] [place] [address] (H) [phone] (V) [phone]       CM faxed over the following information to [facility] located @ [address] [place] [address] (J) [phone] (F) [phone]:      Referral   Face Sheet  Signed Psychiatric Evaluation

## 2018-04-03 PROCEDURE — 99233 SBSQ HOSP IP/OBS HIGH 50: CPT | Performed by: PSYCHIATRY & NEUROLOGY

## 2018-04-03 RX ADMIN — OLANZAPINE 10 MG: 10 TABLET, ORALLY DISINTEGRATING ORAL at 21:51

## 2018-04-03 NOTE — PROGRESS NOTES
Seclusive to room earlier in AM reporting increased fatigue  Scant but polite in conversation  Disappointed he will need to remain in hospital until Friday when he can then attend IOP  Denies SI/HI, AH/VH  ADLs currently being completed  No questions or concerns

## 2018-04-03 NOTE — CASE MANAGEMENT
Per MD Dr Wander Flores, family meeting was held w/ PT and PT's mother Artemio Osman over the telephone today  PT agreeing to D/C on Friday w/ step down to PHP on Tuesday

## 2018-04-03 NOTE — CASE MANAGEMENT
CM met w/ PT today  CM confirmed that he met w/ MD Dr Sulema Rodriguez and PT's mother Raf Park over the telephone  PT too communicated that he agreed to D/C on Friday  PT communicated that he feels he is ready to go now; however, agreed to Friday due to understanding the concern w/ too many days in-between AIP and PHP  CM discussed and reviewed the [facility] in detail  CM confirmed that this is at no cost to PT  PT communicated willingness to meet w/ the  tomorrow to review and sign on w/ services  Agreed to be in contact regarding progression in stabilization and disposition planning

## 2018-04-03 NOTE — PROGRESS NOTES
Progress Note - Behavioral Health   Yesenia Vazquez 26 y o  male MRN: [id number]  Unit/Bed#: [facility] 256-01 Encounter: [id number]    Assessment/Plan   Principal Problem:    Severe episode of recurrent major depressive disorder, with psychotic features ([facility] [address] )      Subjective:  Patient is compliant with medications with no side effects today  Patient have improved affect and is not guarded or paranoid today  No psychotic or disorganized thought process noted during entire evaluation  Patient is more receptive to treatment discussion and step-down to partial outpatient planning  He denies endorsing suicidal or homicidal ideations  I had a family meeting with patient and his mother on phone (20 additional minutes spent on this meeting)  We discussed patient progress since admission in detail including treatment option of olanzapine  I also discussed diagnosis of major depression with psychotic features versus schizoaffective disorder depressed type in detail  Mother and patient agreed with diagnosis of major depression with psychotic features as 1st diagnosis for now  Also discussed the option of step-down to partial outpatient program   Discussed the option of discharge on Friday and patient is starting partial outpatient program on Tuesday next week  Patient did got anxious talking about discharge planning but was able to express his feelings without getting angry or agitated  I also emphasized to mother on importance of following with 90 recommendations  Patient and mother were receptive to all the recommendations      Current Medications:    Current Facility-Administered Medications:  acetaminophen 650 mg Oral Q6H PRN Liliane Middleton MD   aluminum-magnesium hydroxide-simethicone 20 mL Oral Q4H PRN Liliane Middleton MD   benztropine 1 mg Oral Q6H PRN Liliane Middleton MD   haloperidol 5 mg Oral Q6H PRN Liliane Middleton MD   haloperidol lactate 5 mg Intramuscular Q6H PRN Liliane Middleton MD   hydrOXYzine HCL 25 mg Oral Q6H PRN Carlos Sensor, MD   ibuprofen 400 mg Oral Q8H PRN Carlos Sensor, MD   ibuprofen 600 mg Oral Q8H PRN Carlos Sensor, MD   ibuprofen 800 mg Oral Q8H PRN Carlos Sensor, MD   influenza vaccine 0 5 mL Intramuscular Prior to discharge Luis Grider   LORazepam 2 mg Intramuscular Q4H PRN Carlos Sensor, MD   magnesium hydroxide 30 mL Oral Daily PRN Carlos Sensor, MD   nicotine 1 patch Transdermal Daily Carlos Sensor, MD   nicotine polacrilex 4 mg Oral Q2H PRN Latisha IMELDA Redmond   OLANZapine 10 mg Oral HS Zoraida Maya III, DO       Behavioral Health Medications: all current active meds have been reviewed  Vital signs in last 24 hours:  Temp:  [96 7 °F (35 9 °C)-97 °F (36 1 °C)] 96 7 °F (35 9 °C)  HR:  [] 104  Resp:  [16-18] 16  BP: (117)/(62-63) 117/62    Laboratory results:    I have personally reviewed all pertinent laboratory/tests results  Labs in last 72 hours: No results for input(s): WBC, RBC, HGB, HCT, PLT, RDW, NEUTROABS, NA, K, CL, CO2, BUN, CREATININE, GLUCOSE, CALCIUM, AST, ALT, ALKPHOS, PROT, ALBUMIN, BILITOT, CHOL, HDL, TRIG, LDLCALC, VALPROICTOT, CARBAMAZEPIN, LITHIUM, AMMONIA, [id number], FREET4, T3FREE, PREGTESTUR, PREGSERUM, HCG, HCGQUANT, RPR in the last 72 hours      Invalid input(s):  RBC  Admission Labs:   Admission on 03/24/2018   Component Date Value    EXTBreath Alcohol 03/24/2018 neg     Amph/Meth UR 03/24/2018 Negative     Barbiturate Ur 03/24/2018 Negative     Benzodiazepine Urine 03/24/2018 Negative     Cocaine Urine 03/24/2018 Negative     Methadone Urine 03/24/2018 Negative     Opiate Urine 03/24/2018 Negative     PCP Ur 03/24/2018 Negative     THC Urine 03/24/2018 Positive*    WBC 03/24/2018 7 48     RBC 03/24/2018 5 56     Hemoglobin 03/24/2018 16 3     Hematocrit 03/24/2018 46 2     MCV 03/24/2018 83     MCH 03/24/2018 29 3     MCHC 03/24/2018 35 3     RDW 03/24/2018 13 5     MPV 03/24/2018 11 5     Platelets 55/79/7089 240     Neutrophils Relative 03/24/2018 57     Lymphocytes Relative 03/24/2018 32     Monocytes Relative 03/24/2018 10     Eosinophils Relative 03/24/2018 1     Basophils Relative 03/24/2018 0     Neutrophils Absolute 03/24/2018 4 32     Lymphocytes Absolute 03/24/2018 2 36     Monocytes Absolute 03/24/2018 0 73     Eosinophils Absolute 03/24/2018 0 04     Basophils Absolute 03/24/2018 0 03     Sodium 03/24/2018 138     Potassium 03/24/2018 4 5     Chloride 03/24/2018 101     CO2 03/24/2018 25     Anion Gap 03/24/2018 12     BUN 03/24/2018 11     Creatinine 03/24/2018 0 93     Glucose 03/24/2018 145*    Calcium 03/24/2018 8 9     AST 03/24/2018 26     ALT 03/24/2018 26     Alkaline Phosphatase 03/24/2018 98     Total Protein 03/24/2018 8 1     Albumin 03/24/2018 4 3     Total Bilirubin 03/24/2018 1 00     eGFR 03/24/2018 117     Cholesterol 03/26/2018 114     Triglycerides 03/26/2018 67     HDL, Direct 03/26/2018 58     LDL Calculated 03/26/2018 43     TSH 3RD GENERATON 03/26/2018 1 949     Clarity, UA 03/27/2018 Clear     Color, UA 03/27/2018 Yellow     Specific Gravity, UA 03/27/2018 1 025     pH, UA 03/27/2018 6 0     Glucose, UA 03/27/2018 Negative     Ketones, UA 03/27/2018 Negative     Blood, UA 03/27/2018 Negative     Protein, UA 03/27/2018 Negative     Nitrite, UA 03/27/2018 Negative     Bilirubin, UA 03/27/2018 Negative     Urobilinogen, UA 03/27/2018 0 2     Leukocytes, UA 03/27/2018 Negative     WBC, UA 03/27/2018 1-2*    RBC, UA 03/27/2018 1-2*    Bacteria, UA 03/27/2018 None Seen     Epithelial Cells 03/27/2018 Occasional        Psychiatric Review of Systems:  Behavior over the last 24 hours:  improved  Sleep: normal  Appetite: normal  Medication side effects: No  ROS: no complaints    Mental Status Evaluation:  Appearance:  casually dressed   Behavior:  guarded   Speech:  soft   Mood:  anxious Affect:  constricted   Language naming objects   Thought Process:  circumstantial   Thought Content:  less guarded   Perceptual Disturbances: None   Risk Potential: Suicidal Ideations none, Homicidal Ideations none and Potential for Aggression No   Sensorium:  person and place   Cognition:  grossly intact   Consciousness:  awake    Attention: attention span appeared shorter than expected for age   Insight:  fair   Judgment: fair   Intellect fair   Gait/Station: normal gait/station   Motor Activity: no abnormal movements     Progress Toward Goals: progressing    Recommended Treatment:   Continue with group therapy, milieu therapy and occupational therapy  Continue following current medications:   Current Facility-Administered Medications:  acetaminophen 650 mg Oral Q6H PRN Jj Chase MD   aluminum-magnesium hydroxide-simethicone 20 mL Oral Q4H PRN Jj Chase MD   benztropine 1 mg Oral Q6H PRN Jj Chase MD   haloperidol 5 mg Oral Q6H PRN Jj Chase MD   haloperidol lactate 5 mg Intramuscular Q6H PRN Jj Chase MD   hydrOXYzine HCL 25 mg Oral Q6H PRN Jj Chase MD   ibuprofen 400 mg Oral Q8H PRN Jj Chase MD   ibuprofen 600 mg Oral Q8H PRN Jj Chase MD   ibuprofen 800 mg Oral Q8H PRN Jj Chase MD   influenza vaccine 0 5 mL Intramuscular Prior to discharge Luis Grider   LORazepam 2 mg Intramuscular Q4H PRN Jj Chase MD   magnesium hydroxide 30 mL Oral Daily PRN Jj Chase MD   nicotine 1 patch Transdermal Daily Jj Chase MD   nicotine polacrilex 4 mg Oral Q2H PRN IMELDA Asencio   OLANZapine 10 mg Oral HS Galt Rony III, DO       Risks, benefits and possible side effects of Medications:   Risks, benefits, and possible side effects of medications explained to patient and patient verbalizes understanding          This note has been constructed using a voice recognition system  There may be translation, syntax,  or grammatical errors  If you have any questions, please contact the dictating provider

## 2018-04-03 NOTE — CASE MANAGEMENT
Discussed PT's case in rounds, per MD, MD plans to contact PT's mother today to discuss PT's TX  CM provided MD PT's mother Natasha's telephone number to contact  MD agreed to keep CM updated regarding this

## 2018-04-03 NOTE — CASE MANAGEMENT
CM received a telephone call from [facility] @ [facility] (T) [phone] confirming that she was assigned PT's referral  Hitesh Vitale communicated that there are no male BCM's at this time  Hitesh Vitale plans to come out to [facility] to meet w/ PT to open PT up w/ [facility] services

## 2018-04-03 NOTE — CASE MANAGEMENT
CM received (2) voice-mails from PT's mother Artemio Osman (Q) [phone] requesting a call back to speak w/ the MD RODGERS outreached to PT's mother Artemio Osman R) [phone] and provided an update that MD plans to call Artemio Osman today  CM spend time on the phone w/ Artemio Osman and provided further psychoeducation to Natasha's diagnosis and symptom presentation  CM provided detailed information regarding PT's referrals for PHP and Recovery Coaching to [facility] and the services that they provide  CM discussed that the earliest time to get PT into PHP through [facility] is Tuesday 04/10/18  CM discussed PT doing TOP until then if discharged before  Reviewed typical recommendation of D/C from [facility] to start PHP the following day  Artemio Kerrvanessa communicated being in support of PT D/C Monday and starting [facility] Tuesday  CM agreed to review this w/ PT and MD as PT is requesting D/C as soon as possible  CM provided further information and psychoeducation on PT's symptoms and various resources in the community  Discussed long-term planning on injection medications as Artemio Osman reports beliefs that PT was cheeking his medications over the weekend  Artemio Osman reports that she spoke w/ PT's nurse last night who communicated that they were not doing mouth checks over the weekend and reported that the nurse believed that PT wasn't taking his medications  CM assured Artemio Jacqui that per protocol all PT's receive medication mouth checks when given medications to support w/ compliance, etc      Artemio Osman requested that CM put in a request for a male  through [facility]  CM agreed to put in this request and Artemio Jacqui communicated that she understands that this is only a request not a guarantee       Artemio Osman also communicated that PT informed Artemio Osman yesterday stating that PT stated, "I told Dr Wander Flores I'm not crazy and I'm not diagnosed with that " CM provided further information on information reviewed in rounds and reviewed that per MD, PT was agreeing w/ PT's diagnosis  Christian Melvin communicated that PT does better w/ males and has always done better w/ males  CM recommended that the family participate in family therapy, Christian Melvin communicated that she would be open to that  Christian Melvin spent time discussing PT's family mental health HX w/ Natasha's father in law and the long standing struggle w/ the father in law going off his medications and the several hospitalizations and family struggles dealt w/ over the years  CM provided coaching, support, and education regarding addressing barriers in [place] and early intervention  Christian Melvin was again encouraged to seek out [facility] services in order to support herself and provided education on services in the community for family members  CM spent a total of 35 minutes on the phone w/ Christian Melvin and then Christian Melvin ended the call as she believed she was receiving a telephone call from Dr Charanjit Atkinson  Agreed to be in contact regarding progression in stabilization and D/C planning

## 2018-04-04 VITALS
DIASTOLIC BLOOD PRESSURE: 63 MMHG | HEIGHT: 65 IN | TEMPERATURE: 97.8 F | OXYGEN SATURATION: 99 % | SYSTOLIC BLOOD PRESSURE: 144 MMHG | BODY MASS INDEX: 24.71 KG/M2 | RESPIRATION RATE: 16 BRPM | HEART RATE: 114 BPM | WEIGHT: 148.3 LBS

## 2018-04-04 PROCEDURE — 99239 HOSP IP/OBS DSCHRG MGMT >30: CPT | Performed by: PSYCHIATRY & NEUROLOGY

## 2018-04-04 PROCEDURE — 99239 HOSP IP/OBS DSCHRG MGMT >30: CPT | Performed by: PHYSICIAN ASSISTANT

## 2018-04-04 RX ORDER — OLANZAPINE 10 MG/1
10 TABLET ORAL
Qty: 7 TABLET | Refills: 0 | Status: SHIPPED | OUTPATIENT
Start: 2018-04-04

## 2018-04-04 RX ADMIN — NICOTINE 1 PATCH: 21 PATCH, EXTENDED RELEASE TRANSDERMAL at 09:33

## 2018-04-04 NOTE — CASE MANAGEMENT
BEULAH informed CM that he spoke Dr Carlo Talavera who supports PT's D/C for today  CM outreached to [facility] located @ [address] [place] [address] (S) [phone] (O) [phone] and spoke w/ Martin Wood and confirmed that Dr Carlo Talavera is willing to D/C PT today to step down to [facility] tomorrow  CM confirmed that CM is still waiting to hear back from PT's mother   PT is tentatively scheduled to step down to [facility] tomorrow Thursday 04/05/18 @ 9:00am

## 2018-04-04 NOTE — DISCHARGE INSTR - LAB
Contact Information: If you have any questions, concerns, pended studies, tests and/or procedures, or emergencies regarding your inpatient behavioral health visit  Please contact [address] [place] [address] behavioral health unit [phone] and ask to speak to a , nurse or physician  A contact is available 24 hours/ 7 days a week at this number  Summary of Procedures Performed During your Stay:  Below is a list of major procedures performed during your hospital stay and a summary of results:  - No major procedures performed  Pending Studies     None        If studies are pending at discharge, follow up with your PCP and/or referring provider

## 2018-04-04 NOTE — CASE MANAGEMENT
CM received a telephone call from [facility] communicating that they had a cancellation for PHP for tomorrow and could fit PT in tomorrow for [facility] to [facility] [place]  CM notified BEULAH and MD of this  CM outreached to PT's mother Oswaldo Mancilla (h) [phone] and left a message informing Oswaldo Mancilla of this to inquire about her thoughts on this  Requested a call back
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
## 2018-04-04 NOTE — DISCHARGE INSTRUCTIONS
Psychotic Disorder   WHAT YOU NEED TO KNOW:   A psychotic disorder is a medical condition that causes hallucinations and delusions  Hallucinations are seeing, hearing, tasting, or feeling things that are not real  Delusions are beliefs that something is real, true, or right when it is not  These false beliefs do not go away even if there is proof that they are not true  You may believe someone is spying on you, after you, or controlling your mind  You may also believe there is something wrong with how your body works  Schizophrenia and schizoaffective disorder are examples of psychotic disorders  DISCHARGE INSTRUCTIONS:   Call 911 if:   · You feel like you could harm yourself or someone else  Contact your healthcare provider if:   · Your symptoms do not improve  · You cannot make it to your next appointment  · You have new symptoms  · You have questions or concerns about your condition or care  Medicines  may be given to decrease your symptoms  You may need 1 or more medicines  You may need to take your medicine for several weeks before you begin to feel better  Tell your healthcare provider about any side effects or problems you have with your medicines  The type or amount of medicine may need to be changed  Get support: It may be difficult to cope with your illness  You may feel lonely, anxious, or depressed  It may help to join a support group  A support group lets you talk with others who have a mental illness  For information and more support visit:  · [facility]  [address] [facility]  , [address] [place] [address]  Phone: [phone]  Phone: [phone]  Web Address: http://[employer]/  [employer]  Self-care:   · Do not drink alcohol or use illegal drugs  Alcohol and illegal drugs can make your symptoms worse  Ask your healthcare provider for information if you currently drink alcohol or use illegal drugs and need help to quit  · Do not smoke    Nicotine and other chemicals in cigarettes and cigars can cause lung damage  They can also decrease how well your medicine works  Ask your healthcare provider for information if you currently smoke and need help to quit  E-cigarettes or smokeless tobacco still contain nicotine  Talk to your healthcare provider before you use these products  · Exercise regularly  Exercise can help improve your mood and decrease symptoms  Ask about the best exercise plan for you  · Manage your stress  Stress can make your condition worse  Ask your healthcare provider for more information about practicing mindfulness and deep breathing exercises to help decrease your stress  You may learn other ways to manage stress during therapy  Follow up with your healthcare provider as directed:  Write down your questions so you remember to ask them during your visits  © 2017 [address] Information is for End User's use only and may not be sold, redistributed or otherwise used for commercial purposes  All illustrations and images included in CareNotes® are the copyrighted property of A D A M , Inc  or [employer]  The above information is an  only  It is not intended as medical advice for individual conditions or treatments  Talk to your doctor, nurse or pharmacist before following any medical regimen to see if it is safe and effective for you  Depression   WHAT YOU NEED TO KNOW:   Depression is a medical condition that causes feelings of sadness or hopelessness that do not go away  Depression may cause you to lose interest in things you used to enjoy  These feelings may interfere with your daily life  DISCHARGE INSTRUCTIONS:   Call 911 for any of the following:   · You think about harming yourself or someone else  Contact your healthcare provider if:   · Your symptoms do not improve  · You cannot make it to your next appointment  · You have new symptoms      · You have questions or concerns about your condition or care  Medicines:   · Antidepressants  may be given to improve or balance your mood  You may need to take this medicine for several weeks before you begin to feel better  · Take your medicine as directed  Contact your healthcare provider if you think your medicine is not helping or if you have side effects  Tell him of her if you are allergic to any medicine  Keep a list of the medicines, vitamins, and herbs you take  Include the amounts, and when and why you take them  Bring the list or the pill bottles to follow-up visits  Carry your medicine list with you in case of an emergency  Therapy  may be used to treat your depression  A therapist will help you learn to cope with your thoughts and feelings  This can be done alone or in a group  It may also be done with family members or a significant other  Self-care:   · Get regular physical activity  Try to exercise for 30 minutes, 3 to 5 days a week  Work with your healthcare provider to develop an exercise plan that you enjoy  Physical activity may improve your symptoms  · Get enough sleep  Create a routine to help you relax before bed  You can listen to music, read, or do yoga  Try to go to bed and wake up at the same time every day  Sleep is important for emotional health  · Eat a variety of healthy foods from all of the food groups  A healthy meal plan is low in fat, salt, and added sugar  Ask your healthcare provider for more information about a meal plan that is right for you  · Do not drink alcohol or use drugs  Alcohol and drugs can make your symptoms worse  Follow up with your healthcare provider as directed: Your healthcare provider will monitor your progress at follow-up visits  He or she will also monitor your medicine if you take antidepressants  Your healthcare provider will ask if the medicine is helping  Tell him or her about any side effects or problems you may have with your medicine   The type or amount of medicine may need to be changed  Write down your questions so you remember to ask them during your visits  © 2017 [address] Information is for End User's use only and may not be sold, redistributed or otherwise used for commercial purposes  All illustrations and images included in CareNotes® are the copyrighted property of A D A M , Inc  or [employer]  The above information is an  only  It is not intended as medical advice for individual conditions or treatments  Talk to your doctor, nurse or pharmacist before following any medical regimen to see if it is safe and effective for you

## 2018-04-04 NOTE — CASE MANAGEMENT
CM met w/ PT today to remind PT that a BCM from [facility] will be coming out to meet w/ PT today for an in-take assessment for [facility] services  PT reported that a BCM [facility] already came out and met w/ PT today and PT completed the in-take/ assessment for [facility] services  PT reports that [facility] plans to follow up w/ PT on Monday for ongoing community case management services

## 2018-04-04 NOTE — PROGRESS NOTES
Pt waiting for his mother to pick him up for discharge  He verbalizes readiness to go  Smiling and states he feels happy to be leaving  Denies any questions about discharge instructions which were reviewed with him by previous shift  Eating dinner currently

## 2018-04-04 NOTE — PROGRESS NOTES
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

## 2018-04-04 NOTE — CASE MANAGEMENT
CM outreached to [address] [facility] @ [facility] (S) [phone] and left a voice-mail notifying [facility] that PT is projected to D/C today and step down to [facility] tomorrow due to [facility] now having an earlier opening  CM requested a call back

## 2018-04-04 NOTE — CASE MANAGEMENT
CM met w/ PT today and reviewed option of D/C today and start PHP tomorrow  PT confirmed being in support of D/C, follow up services, and continues to express readiness for D/C  CM and PT outreached to PT's mother Jan Owens (Y) [phone] and CM and PT spoke w/ Jan Owens and reviewed this information  Jan Owens confirmed being in support of this planning and confirmed that she will pick PT up tonight @ 6:30pm      CM outreached to [facility] located @ [address] [place] [address] (O) [phone] (I) [phone] and and left a voice-mail for [employer] confirming that PT will D/C today and start PHP tomorrow

## 2018-04-04 NOTE — CASE MANAGEMENT
CM outreached to [address] @ [facility] AT [facility] (L) [phone] who confirmed that she came out to meet w/ PT today and signed PT on w/ [facility] services  CM reviewed that PT is scheduled to start PHP on Tuesday  Franky Ritter communicated that she plans to follow up w/ PT on Monday to schedule to follow up w/ PT next week  Franky Ritter communicated that PT declined to sign a SHASHI for PT's mother for [facility] to be in contact with  CM provided an extensive update regarding PT's [place] at [facility], etc  Discussed PT's D/C for Friday  Agreed to be in contact regarding progression in stabilization and D/C planning

## 2018-04-04 NOTE — PROGRESS NOTES
Looking forward to discharge on Friday  Met with ZEKE from [facility] and we discussed some of the services they provide  Denies SI/HI  Exercising and utilizing radio  Continues to express interest in joining the [employer]  Compliant with medications, denies side effects

## 2018-04-04 NOTE — PROGRESS NOTES
Progress Note - Behavioral Health     Parvin Bryan 26 y o  male MRN: @MRN   Unit/Bed#: Thomas Lat[address] 256-01 Encounter: [id number]    Patient is guarded but doing ok  Parvin Bryan reports today that he is doing ok  Looking forward to d/c Friday  He has more scattered thoughts  No AH or VH  Anxiety 2/10, Depression 1/10  No SI or HI  Very low depression, 'best in a long time"  Agreeable to plan  Talked a lot today about his mistrust     Sleep: normal  Appetite: normal  Medication side effects: No   ROS: no complaints but a little tired    Mental Status Evaluation:    Appearance:  age appropriate   Behavior:  pleasant, cooperative, with good eye contact ; less guarded   Speech:  Normal volume, regular rate and rhythm   Mood:  dysphoric, anxious   Affect:  constricted       Thought Process:  Linear and goal directed   Associations: intact associations   Thought Content:  normal and appropriate   Perceptual Disturbances: no auditory or visual hallcunations   Risk Potential: Suicidal ideation - None  Homicidal ideation - None  Potential for aggression - No   Sensorium:  A&Ox3   Cognition:  grossly intact   Consciousness:  Alert & Awake   Memory:  recent and remote memory grossly intact   Attention: attention span and concentration are age appropriate   Intellect: within normal limits       Insight:  fair   Judgment: good         Gait/Station: normal gait/station with good balance   Motor Activity: no abnormal movements     Vital signs in last 24 hours:    Temp:  [96 5 °F (35 8 °C)-97 5 °F (36 4 °C)] 96 5 °F (35 8 °C)  HR:  [81-92] 81  Resp:  [16-17] 16  BP: (114-121)/(58-81) 114/58    Laboratory results:  I have personally reviewed all pertinent laboratory/tests results      Progress Toward Goals:  some improvement  Assessment/Plan   Principal Problem:    Severe episode of recurrent major depressive disorder, with psychotic features ([facility] [address] )      Parvin Bryan is presenting with paranoia that is possibly due to anxiety, depression, and acute stress but could be suggestive of a more significant psychotic process  Had a long dialogue today with him, and asked that he be honest with outpatient providers in moving forward so that he gets the help he needs  Meds seem good where they are, no issues and some benefit  Recommended Treatment:     Planned medication and treatment changes: All current active medications have been reviewed  Current Facility-Administered Medications:  acetaminophen 650 mg Oral Q6H PRN Dipti Cooley MD   aluminum-magnesium hydroxide-simethicone 20 mL Oral Q4H PRN Dipti Cooley MD   benztropine 1 mg Oral Q6H PRN Dipti Cooley MD   haloperidol 5 mg Oral Q6H PRN Dipti Cooley MD   haloperidol lactate 5 mg Intramuscular Q6H PRN Dipti Cooley MD   hydrOXYzine HCL 25 mg Oral Q6H PRN Dipti Cooley MD   ibuprofen 400 mg Oral Q8H PRN Dipti Cooley MD   ibuprofen 600 mg Oral Q8H PRN Dipti Cooley MD   ibuprofen 800 mg Oral Q8H PRN Dipti Cooley MD   influenza vaccine 0 5 mL Intramuscular Prior to discharge Luis Grider   LORazepam 2 mg Intramuscular Q4H PRN Dipti Cooley MD   magnesium hydroxide 30 mL Oral Daily PRN Dipti Cooley MD   nicotine 1 patch Transdermal Daily Dipti Cooley MD   nicotine polacrilex 4 mg Oral Q2H PRN IMELDA Hall   OLANZapine 10 mg Oral HS Peewee Guajardo III, DO       1) continue current treatment  2) Continue to support patient and engage them in the programs available as feasible and appropriate  Continue case management support and therapy  Continue discharge planning  3) planned discharge Friday    Risks / Benefits of Treatment:    Risks, benefits, and possible side effects of medications explained to patient and patient verbalizes understanding and agreement for treatment      Counseling / Coordination of Care:    Patient's progress discussed with staff in treatment team meeting  Medications, treatment progress and treatment plan reviewed with patient        Eren Chris III, DO  4/4/2018  12:40 PM

## 2018-04-04 NOTE — CASE MANAGEMENT
CM outreached to PT's mother Tomas Luke (x) [phone]  CM provided an update on [facility] Akilah's assessment today  Reviewed that PT did not sign a SHASHI for Sharran Simmonds to provide clinical info regarding PT; however, CM provided Akilah's contact information to Tomas Luke  Discussed that Sharran Simmonds plans to follow up w/ PT next week to meet w/ PT for ongoing community case management services  Tomas Luke communicated that she came to visit PT last night w/ PT's brother  Tomas Luke communicated that PT was angry w/ them that PT wasn't discharged  CM provided psychoeducation on projection and information in regards to coaching and support w/ managing these conversations  Tomas Luke confirmed that PT's brother Rubens Petty is planning on visiting PT this evening and Tomas Luke plans to pick PT up Friday 04/06/18 @ 6:30pm      Agreed to be in contact regarding any updates w/ progression in stabilization and D/C planning

## 2018-04-17 ENCOUNTER — TELEPHONE (OUTPATIENT)
Dept: CASE MANAGEMENT | Facility: HOSPITAL | Age: 23
End: 2018-04-17

## 2018-04-17 NOTE — CASE MANAGEMENT
CM received a telephone call from PT's mother Moriah Maloney (DAVE) [phone] on 4/17/18 @ 10:10am communicating that PT went to PHP through [facility] 1 day and then never went back  Per Aj Lucia was set up w/ a psychiatrist through [facility]; however, PT has not followed through w/ a therapist  Nimesh Watt reports, "He just won't make the call and they wont' let me schedule it, he has too " Nimesh Watt communicated that PT will not sign any SHASHI's for Nimesh Watt to be in contact w/ any of PT's O/P [address] providers  Nimesh Watt continued to express concerns regarding that PT is gaining weight and eating more; however, won't exercise  Nimesh Watt also reports that PT recently joined a [employer] and started going to [employer] study and developed a peer group there and went to the movies, etc  Nimesh Watt communicated desire to move forward w/ obtaining a medical POA for PT  Nimesh Watt had questions regarding PT's authorization and coverage through PT's hospitalization as she received a letter in the mail  CM clarified that PT's entire hospitalization was covered, per UR  CM provided Nimesh Watt w/ PT's  Akilah's contact information to follow up w/ her regarding concerns